# Patient Record
Sex: FEMALE | Race: WHITE | NOT HISPANIC OR LATINO | URBAN - NONMETROPOLITAN AREA
[De-identification: names, ages, dates, MRNs, and addresses within clinical notes are randomized per-mention and may not be internally consistent; named-entity substitution may affect disease eponyms.]

---

## 2023-01-09 LAB
EXTERNAL HEPATITIS B SURFACE ANTIGEN: NEGATIVE
EXTERNAL RUBELLA QUALITATIVE: NORMAL
EXTERNAL SYPHILIS RPR SCREEN: NORMAL
HIV1 P24 AG SERPL QL IA: NORMAL

## 2023-06-29 LAB — EXTERNAL GROUP B STREP ANTIGEN: NEGATIVE

## 2023-07-17 ENCOUNTER — HOSPITAL ENCOUNTER (INPATIENT)
Facility: HOSPITAL | Age: 28
LOS: 3 days | Discharge: HOME | End: 2023-07-20
Attending: OBSTETRICS & GYNECOLOGY | Admitting: OBSTETRICS & GYNECOLOGY
Payer: COMMERCIAL

## 2023-07-17 PROBLEM — Z34.90 PREGNANCY: Status: ACTIVE | Noted: 2023-07-17

## 2023-07-17 LAB
ABO GROUP BLD: NORMAL
ABO GROUP BLD: NORMAL
AMPHET+METHAMPHET UR QL: NEGATIVE
AMPHETAMINES UR QL: NEGATIVE
BARBITURATES UR QL SCN: NEGATIVE
BASOPHILS # BLD AUTO: 0.03 10*3/MM3 (ref 0–0.2)
BASOPHILS NFR BLD AUTO: 0.3 % (ref 0–1.5)
BENZODIAZ UR QL SCN: NEGATIVE
BLD GP AB SCN SERPL QL: NEGATIVE
BUPRENORPHINE SERPL-MCNC: NEGATIVE NG/ML
CANNABINOIDS SERPL QL: NEGATIVE
COCAINE UR QL: NEGATIVE
DEPRECATED RDW RBC AUTO: 43.8 FL (ref 37–54)
EOSINOPHIL # BLD AUTO: 0.1 10*3/MM3 (ref 0–0.4)
EOSINOPHIL NFR BLD AUTO: 1 % (ref 0.3–6.2)
ERYTHROCYTE [DISTWIDTH] IN BLOOD BY AUTOMATED COUNT: 13.4 % (ref 12.3–15.4)
FENTANYL UR-MCNC: NEGATIVE NG/ML
HCT VFR BLD AUTO: 41.5 % (ref 34–46.6)
HGB BLD-MCNC: 13.3 G/DL (ref 12–15.9)
IMM GRANULOCYTES # BLD AUTO: 0.05 10*3/MM3 (ref 0–0.05)
IMM GRANULOCYTES NFR BLD AUTO: 0.5 % (ref 0–0.5)
LYMPHOCYTES # BLD AUTO: 2.5 10*3/MM3 (ref 0.7–3.1)
LYMPHOCYTES NFR BLD AUTO: 24.9 % (ref 19.6–45.3)
MCH RBC QN AUTO: 28.6 PG (ref 26.6–33)
MCHC RBC AUTO-ENTMCNC: 32 G/DL (ref 31.5–35.7)
MCV RBC AUTO: 89.2 FL (ref 79–97)
METHADONE UR QL SCN: NEGATIVE
MONOCYTES # BLD AUTO: 0.72 10*3/MM3 (ref 0.1–0.9)
MONOCYTES NFR BLD AUTO: 7.2 % (ref 5–12)
NEUTROPHILS NFR BLD AUTO: 6.63 10*3/MM3 (ref 1.7–7)
NEUTROPHILS NFR BLD AUTO: 66.1 % (ref 42.7–76)
NRBC BLD AUTO-RTO: 0 /100 WBC (ref 0–0.2)
OPIATES UR QL: NEGATIVE
OXYCODONE UR QL SCN: NEGATIVE
PCP UR QL SCN: NEGATIVE
PLATELET # BLD AUTO: 230 10*3/MM3 (ref 140–450)
PMV BLD AUTO: 10.6 FL (ref 6–12)
PROPOXYPH UR QL: NEGATIVE
RBC # BLD AUTO: 4.65 10*6/MM3 (ref 3.77–5.28)
RH BLD: POSITIVE
RH BLD: POSITIVE
T&S EXPIRATION DATE: NORMAL
TRICYCLICS UR QL SCN: NEGATIVE
WBC NRBC COR # BLD: 10.03 10*3/MM3 (ref 3.4–10.8)

## 2023-07-17 PROCEDURE — 86901 BLOOD TYPING SEROLOGIC RH(D): CPT

## 2023-07-17 PROCEDURE — 80307 DRUG TEST PRSMV CHEM ANLYZR: CPT | Performed by: OBSTETRICS & GYNECOLOGY

## 2023-07-17 PROCEDURE — 86900 BLOOD TYPING SEROLOGIC ABO: CPT | Performed by: OBSTETRICS & GYNECOLOGY

## 2023-07-17 PROCEDURE — 85025 COMPLETE CBC W/AUTO DIFF WBC: CPT | Performed by: OBSTETRICS & GYNECOLOGY

## 2023-07-17 PROCEDURE — 86850 RBC ANTIBODY SCREEN: CPT | Performed by: OBSTETRICS & GYNECOLOGY

## 2023-07-17 PROCEDURE — 86900 BLOOD TYPING SEROLOGIC ABO: CPT

## 2023-07-17 PROCEDURE — 86901 BLOOD TYPING SEROLOGIC RH(D): CPT | Performed by: OBSTETRICS & GYNECOLOGY

## 2023-07-17 RX ORDER — MISOPROSTOL 100 UG/1
25 TABLET ORAL EVERY 4 HOURS PRN
Status: DISCONTINUED | OUTPATIENT
Start: 2023-07-17 | End: 2023-07-19 | Stop reason: HOSPADM

## 2023-07-17 RX ORDER — ACETAMINOPHEN 325 MG/1
650 TABLET ORAL EVERY 4 HOURS PRN
Status: DISCONTINUED | OUTPATIENT
Start: 2023-07-17 | End: 2023-07-19 | Stop reason: HOSPADM

## 2023-07-17 RX ORDER — OXYTOCIN/0.9 % SODIUM CHLORIDE 30/500 ML
2-20 PLASTIC BAG, INJECTION (ML) INTRAVENOUS
Status: DISCONTINUED | OUTPATIENT
Start: 2023-07-18 | End: 2023-07-19 | Stop reason: HOSPADM

## 2023-07-17 RX ORDER — HYDROXYZINE HYDROCHLORIDE 25 MG/1
50 TABLET, FILM COATED ORAL NIGHTLY PRN
Status: DISCONTINUED | OUTPATIENT
Start: 2023-07-17 | End: 2023-07-19 | Stop reason: HOSPADM

## 2023-07-17 RX ORDER — TERBUTALINE SULFATE 1 MG/ML
0.25 INJECTION, SOLUTION SUBCUTANEOUS AS NEEDED
Status: DISCONTINUED | OUTPATIENT
Start: 2023-07-17 | End: 2023-07-19 | Stop reason: HOSPADM

## 2023-07-17 RX ORDER — SODIUM CHLORIDE, SODIUM LACTATE, POTASSIUM CHLORIDE, CALCIUM CHLORIDE 600; 310; 30; 20 MG/100ML; MG/100ML; MG/100ML; MG/100ML
125 INJECTION, SOLUTION INTRAVENOUS CONTINUOUS
Status: DISCONTINUED | OUTPATIENT
Start: 2023-07-17 | End: 2023-07-19

## 2023-07-17 RX ORDER — BUTORPHANOL TARTRATE 1 MG/ML
1 INJECTION, SOLUTION INTRAMUSCULAR; INTRAVENOUS
Status: DISCONTINUED | OUTPATIENT
Start: 2023-07-17 | End: 2023-07-19 | Stop reason: HOSPADM

## 2023-07-17 RX ORDER — ONDANSETRON 4 MG/1
4 TABLET, FILM COATED ORAL EVERY 6 HOURS PRN
Status: DISCONTINUED | OUTPATIENT
Start: 2023-07-17 | End: 2023-07-19 | Stop reason: HOSPADM

## 2023-07-17 RX ORDER — MAGNESIUM HYDROXIDE 1200 MG/15ML
1000 LIQUID ORAL ONCE AS NEEDED
Status: DISCONTINUED | OUTPATIENT
Start: 2023-07-17 | End: 2023-07-19 | Stop reason: HOSPADM

## 2023-07-17 RX ORDER — ONDANSETRON 2 MG/ML
4 INJECTION INTRAMUSCULAR; INTRAVENOUS EVERY 6 HOURS PRN
Status: DISCONTINUED | OUTPATIENT
Start: 2023-07-17 | End: 2023-07-19 | Stop reason: HOSPADM

## 2023-07-17 RX ORDER — SODIUM CHLORIDE 0.9 % (FLUSH) 0.9 %
3-10 SYRINGE (ML) INJECTION AS NEEDED
Status: DISCONTINUED | OUTPATIENT
Start: 2023-07-17 | End: 2023-07-19 | Stop reason: HOSPADM

## 2023-07-18 PROCEDURE — 3E033VJ INTRODUCTION OF OTHER HORMONE INTO PERIPHERAL VEIN, PERCUTANEOUS APPROACH: ICD-10-PCS | Performed by: OBSTETRICS & GYNECOLOGY

## 2023-07-18 PROCEDURE — C1755 CATHETER, INTRASPINAL: HCPCS

## 2023-07-18 PROCEDURE — 25010000002 KETOROLAC TROMETHAMINE PER 15 MG: Performed by: OBSTETRICS & GYNECOLOGY

## 2023-07-18 PROCEDURE — 25010000002 ROPIVACAINE PER 1 MG: Performed by: ANESTHESIOLOGY

## 2023-07-18 RX ORDER — ONDANSETRON 2 MG/ML
4 INJECTION INTRAMUSCULAR; INTRAVENOUS EVERY 6 HOURS PRN
Status: DISCONTINUED | OUTPATIENT
Start: 2023-07-18 | End: 2023-07-19 | Stop reason: HOSPADM

## 2023-07-18 RX ORDER — SODIUM CHLORIDE, SODIUM LACTATE, POTASSIUM CHLORIDE, CALCIUM CHLORIDE 600; 310; 30; 20 MG/100ML; MG/100ML; MG/100ML; MG/100ML
125 INJECTION, SOLUTION INTRAVENOUS CONTINUOUS
Status: DISCONTINUED | OUTPATIENT
Start: 2023-07-18 | End: 2023-07-19

## 2023-07-18 RX ORDER — MORPHINE SULFATE 2 MG/ML
2 INJECTION, SOLUTION INTRAMUSCULAR; INTRAVENOUS
Status: DISCONTINUED | OUTPATIENT
Start: 2023-07-18 | End: 2023-07-19 | Stop reason: HOSPADM

## 2023-07-18 RX ORDER — PROMETHAZINE HYDROCHLORIDE 12.5 MG/1
12.5 SUPPOSITORY RECTAL EVERY 6 HOURS PRN
Status: DISCONTINUED | OUTPATIENT
Start: 2023-07-18 | End: 2023-07-19 | Stop reason: HOSPADM

## 2023-07-18 RX ORDER — SODIUM CHLORIDE 9 MG/ML
40 INJECTION, SOLUTION INTRAVENOUS AS NEEDED
Status: DISCONTINUED | OUTPATIENT
Start: 2023-07-18 | End: 2023-07-19

## 2023-07-18 RX ORDER — OXYTOCIN/0.9 % SODIUM CHLORIDE 30/500 ML
999 PLASTIC BAG, INJECTION (ML) INTRAVENOUS ONCE
Status: DISCONTINUED | OUTPATIENT
Start: 2023-07-18 | End: 2023-07-19 | Stop reason: HOSPADM

## 2023-07-18 RX ORDER — ONDANSETRON 2 MG/ML
4 INJECTION INTRAMUSCULAR; INTRAVENOUS ONCE AS NEEDED
Status: DISCONTINUED | OUTPATIENT
Start: 2023-07-18 | End: 2023-07-19 | Stop reason: HOSPADM

## 2023-07-18 RX ORDER — METOCLOPRAMIDE HYDROCHLORIDE 5 MG/ML
10 INJECTION INTRAMUSCULAR; INTRAVENOUS EVERY 6 HOURS PRN
Status: DISCONTINUED | OUTPATIENT
Start: 2023-07-18 | End: 2023-07-19 | Stop reason: HOSPADM

## 2023-07-18 RX ORDER — EPHEDRINE SULFATE 5 MG/ML
10 INJECTION INTRAVENOUS
Status: DISCONTINUED | OUTPATIENT
Start: 2023-07-18 | End: 2023-07-19 | Stop reason: HOSPADM

## 2023-07-18 RX ORDER — ONDANSETRON 4 MG/1
4 TABLET, FILM COATED ORAL EVERY 6 HOURS PRN
Status: DISCONTINUED | OUTPATIENT
Start: 2023-07-18 | End: 2023-07-19 | Stop reason: HOSPADM

## 2023-07-18 RX ORDER — SODIUM CHLORIDE 0.9 % (FLUSH) 0.9 %
10 SYRINGE (ML) INJECTION AS NEEDED
Status: DISCONTINUED | OUTPATIENT
Start: 2023-07-18 | End: 2023-07-19

## 2023-07-18 RX ORDER — PRENATAL VIT NO.126/IRON/FOLIC 28MG-0.8MG
1 TABLET ORAL DAILY
COMMUNITY

## 2023-07-18 RX ORDER — PRENATAL VIT/IRON FUM/FOLIC AC 27MG-0.8MG
1 TABLET ORAL DAILY
Status: DISCONTINUED | OUTPATIENT
Start: 2023-07-18 | End: 2023-07-19

## 2023-07-18 RX ORDER — METHYLERGONOVINE MALEATE 0.2 MG/ML
200 INJECTION INTRAVENOUS AS NEEDED
Status: DISCONTINUED | OUTPATIENT
Start: 2023-07-18 | End: 2023-07-19 | Stop reason: HOSPADM

## 2023-07-18 RX ORDER — KETOROLAC TROMETHAMINE 30 MG/ML
30 INJECTION, SOLUTION INTRAMUSCULAR; INTRAVENOUS ONCE
Status: COMPLETED | OUTPATIENT
Start: 2023-07-18 | End: 2023-07-18

## 2023-07-18 RX ORDER — SODIUM CHLORIDE, SODIUM LACTATE, POTASSIUM CHLORIDE, CALCIUM CHLORIDE 600; 310; 30; 20 MG/100ML; MG/100ML; MG/100ML; MG/100ML
125 INJECTION, SOLUTION INTRAVENOUS ONCE
Status: DISCONTINUED | OUTPATIENT
Start: 2023-07-18 | End: 2023-07-19

## 2023-07-18 RX ORDER — PROMETHAZINE HYDROCHLORIDE 25 MG/1
12.5 TABLET ORAL EVERY 6 HOURS PRN
Status: DISCONTINUED | OUTPATIENT
Start: 2023-07-18 | End: 2023-07-19 | Stop reason: HOSPADM

## 2023-07-18 RX ORDER — FAMOTIDINE 10 MG/ML
20 INJECTION, SOLUTION INTRAVENOUS ONCE AS NEEDED
Status: DISCONTINUED | OUTPATIENT
Start: 2023-07-18 | End: 2023-07-19 | Stop reason: HOSPADM

## 2023-07-18 RX ORDER — SODIUM CHLORIDE 0.9 % (FLUSH) 0.9 %
10 SYRINGE (ML) INJECTION EVERY 12 HOURS SCHEDULED
Status: DISCONTINUED | OUTPATIENT
Start: 2023-07-18 | End: 2023-07-19

## 2023-07-18 RX ORDER — OXYTOCIN/0.9 % SODIUM CHLORIDE 30/500 ML
250 PLASTIC BAG, INJECTION (ML) INTRAVENOUS CONTINUOUS
Status: ACTIVE | OUTPATIENT
Start: 2023-07-18 | End: 2023-07-18

## 2023-07-18 RX ORDER — MISOPROSTOL 100 UG/1
600 TABLET ORAL AS NEEDED
Status: DISCONTINUED | OUTPATIENT
Start: 2023-07-18 | End: 2023-07-19 | Stop reason: HOSPADM

## 2023-07-18 RX ORDER — FOLIC ACID 1 MG/1
1 TABLET ORAL DAILY
COMMUNITY
End: 2023-07-20 | Stop reason: HOSPADM

## 2023-07-18 RX ORDER — ACETAMINOPHEN 500 MG
1000 TABLET ORAL ONCE
Status: COMPLETED | OUTPATIENT
Start: 2023-07-18 | End: 2023-07-18

## 2023-07-18 RX ORDER — MAGNESIUM HYDROXIDE 1200 MG/15ML
3000 LIQUID ORAL ONCE AS NEEDED
Status: DISCONTINUED | OUTPATIENT
Start: 2023-07-18 | End: 2023-07-19 | Stop reason: HOSPADM

## 2023-07-18 RX ORDER — METHYLERGONOVINE MALEATE 0.2 MG/ML
200 INJECTION INTRAVENOUS ONCE AS NEEDED
Status: DISCONTINUED | OUTPATIENT
Start: 2023-07-18 | End: 2023-07-19 | Stop reason: HOSPADM

## 2023-07-18 RX ORDER — METOCLOPRAMIDE HYDROCHLORIDE 5 MG/ML
10 INJECTION INTRAMUSCULAR; INTRAVENOUS ONCE AS NEEDED
Status: DISCONTINUED | OUTPATIENT
Start: 2023-07-18 | End: 2023-07-19 | Stop reason: HOSPADM

## 2023-07-18 RX ORDER — MIDAZOLAM HYDROCHLORIDE 1 MG/ML
1 INJECTION INTRAMUSCULAR; INTRAVENOUS
Status: DISCONTINUED | OUTPATIENT
Start: 2023-07-18 | End: 2023-07-19

## 2023-07-18 RX ORDER — SODIUM CHLORIDE 0.9 % (FLUSH) 0.9 %
3-10 SYRINGE (ML) INJECTION AS NEEDED
Status: DISCONTINUED | OUTPATIENT
Start: 2023-07-18 | End: 2023-07-19 | Stop reason: HOSPADM

## 2023-07-18 RX ORDER — ROPIVACAINE HYDROCHLORIDE 2 MG/ML
14 INJECTION, SOLUTION EPIDURAL; INFILTRATION; PERINEURAL CONTINUOUS
Status: DISCONTINUED | OUTPATIENT
Start: 2023-07-18 | End: 2023-07-19

## 2023-07-18 RX ORDER — FOLIC ACID 1 MG/1
1 TABLET ORAL DAILY
Status: DISCONTINUED | OUTPATIENT
Start: 2023-07-18 | End: 2023-07-19

## 2023-07-18 RX ORDER — CARBOPROST TROMETHAMINE 250 UG/ML
250 INJECTION, SOLUTION INTRAMUSCULAR AS NEEDED
Status: DISCONTINUED | OUTPATIENT
Start: 2023-07-18 | End: 2023-07-19 | Stop reason: HOSPADM

## 2023-07-18 RX ADMIN — ACETAMINOPHEN 1000 MG: 500 TABLET ORAL at 20:25

## 2023-07-18 RX ADMIN — ROPIVACAINE HYDROCHLORIDE 14 ML/HR: 2 INJECTION, SOLUTION EPIDURAL; INFILTRATION at 17:09

## 2023-07-18 RX ADMIN — MISOPROSTOL 25 MCG: 100 TABLET ORAL at 04:04

## 2023-07-18 RX ADMIN — SODIUM CHLORIDE, POTASSIUM CHLORIDE, SODIUM LACTATE AND CALCIUM CHLORIDE 125 ML/HR: 600; 310; 30; 20 INJECTION, SOLUTION INTRAVENOUS at 15:13

## 2023-07-18 RX ADMIN — SODIUM CHLORIDE, POTASSIUM CHLORIDE, SODIUM LACTATE AND CALCIUM CHLORIDE 125 ML/HR: 600; 310; 30; 20 INJECTION, SOLUTION INTRAVENOUS at 00:05

## 2023-07-18 RX ADMIN — KETOROLAC TROMETHAMINE 30 MG: 30 INJECTION, SOLUTION INTRAMUSCULAR; INTRAVENOUS at 22:32

## 2023-07-18 RX ADMIN — Medication 2 MILLI-UNITS/MIN: at 15:17

## 2023-07-18 RX ADMIN — SODIUM CHLORIDE, POTASSIUM CHLORIDE, SODIUM LACTATE AND CALCIUM CHLORIDE 1000 ML: 600; 310; 30; 20 INJECTION, SOLUTION INTRAVENOUS at 10:37

## 2023-07-18 RX ADMIN — Medication 250 ML/HR: at 22:07

## 2023-07-18 RX ADMIN — MISOPROSTOL 25 MCG: 100 TABLET ORAL at 00:10

## 2023-07-18 RX ADMIN — MISOPROSTOL 25 MCG: 100 TABLET ORAL at 10:58

## 2023-07-18 NOTE — PLAN OF CARE
Problem: Adult Inpatient Plan of Care  Goal: Patient-Specific Goal (Individualized)  Outcome: Ongoing, Progressing   Goal Outcome Evaluation:                 Garcia Bulb in place, pitocin infusing, epidural infusing, pt tolerating labor at this time, will cont to monitor

## 2023-07-18 NOTE — PROGRESS NOTES
In to see pt    Pt resting comfortably with epidural. S/p cytotec x 3    Cvx: 1/50/-3  Ludwig bulb placed for additional cervical ripening  Will start pitocin to run while ludwig bulb in place    Kassi Martinez MD

## 2023-07-18 NOTE — H&P
SUNITA Verdugo  Obstetric History and Physical    Chief Complaint   Patient presents with    Scheduled Induction     PT ARRIVED TO L/D FOR SCHEDULED TERM CYTOTEC INDUCTION.       Subjective     Patient is a 27 y.o. female  currently at 39w0d, who presents with elective IOL at term.    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     She denies history of asthma, HTN, HSV.      Prenatal Information:   Maternal Prenatal Labs  Blood Type ABO Type   Date Value Ref Range Status   2023 A  Final      Rh Status RH type   Date Value Ref Range Status   2023 Positive  Final      Antibody Screen Antibody Screen   Date Value Ref Range Status   2023 Negative  Final      Rapid Urin Drug Screen Barbiturates Screen, Urine   Date Value Ref Range Status   2023 Negative Negative Final     Benzodiazepine Screen, Urine   Date Value Ref Range Status   2023 Negative Negative Final     Methadone Screen, Urine   Date Value Ref Range Status   2023 Negative Negative Final     Opiate Screen   Date Value Ref Range Status   2023 Negative Negative Final     THC, Screen, Urine   Date Value Ref Range Status   2023 Negative Negative Final     Cocaine Screen, Urine   Date Value Ref Range Status   2023 Negative Negative Final     Amphetamine Screen, Urine   Date Value Ref Range Status   2023 Negative Negative Final     Propoxyphene Screen   Date Value Ref Range Status   2023 Negative Negative Final     Buprenorphine, Screen, Urine   Date Value Ref Range Status   2023 Negative Negative Final     Methamphetamine, Ur   Date Value Ref Range Status   2023 Negative Negative Final     Oxycodone Screen, Urine   Date Value Ref Range Status   2023 Negative Negative Final     Tricyclic Antidepressants  Screen   Date Value Ref Range Status   2023 Negative Negative Final      Group B Strep Culture No results found for: GBSANTIGEN           External Prenatal Results       Pregnancy Outside Results - Transcribed From Office Records - See Scanned Records For Details       Test Value Date Time    ABO  A  23    Rh  Positive  23    Antibody Screen  Negative  23    Varicella IgG       Rubella ^ Non-Immune  23     Hgb  13.3 g/dL 23    Hct  41.5 % 23    Glucose Fasting GTT       Glucose Tolerance Test 1 hour       Glucose Tolerance Test 3 hour       Gonorrhea (discrete)       Chlamydia (discrete)       RPR ^ Non-Reactive  23     VDRL       Syphilis Antibody       HBsAg ^ Negative  23     Herpes Simplex Virus PCR       Herpes Simplex VIrus Culture       HIV ^ Non-Reactive  23     Hep C RNA Quant PCR       Hep C Antibody       AFP       Group B Strep ^ Negative  23     GBS Susceptibility to Clindamycin       GBS Susceptibility to Erythromycin       Fetal Fibronectin       Genetic Testing, Maternal Blood                 Drug Screening       Test Value Date Time    Urine Drug Screen       Amphetamine Screen  Negative  23    Barbiturate Screen  Negative  23    Benzodiazepine Screen  Negative  23    Methadone Screen  Negative  23    Phencyclidine Screen  Negative  23    Opiates Screen  Negative  23    THC Screen  Negative  23    Cocaine Screen       Propoxyphene Screen  Negative  23    Buprenorphine Screen  Negative  23    Methamphetamine Screen       Oxycodone Screen  Negative  23    Tricyclic Antidepressants Screen  Negative  23              Legend    ^: Historical                              Past OB History:     OB History    Para Term  AB Living   1 0 0 0 0 0   SAB IAB Ectopic Molar Multiple Live Births    0 0 0 0 0 0      # Outcome Date GA Lbr Vadim/2nd Weight Sex Delivery Anes PTL Lv   1 Current                Past Medical History: Past Medical History:   Diagnosis Date    Anxiety     Asthma     Depression     Klippel-Feil syndrome       Past Surgical History No past surgical history on file.   Family History: No family history on file.   Social History:  has no history on file for tobacco use.   has no history on file for alcohol use.   has no history on file for drug use.        Review of Systems  Complete ROS including constitutional, skin, HENT, Eyes, CV, Resp, GI, , MS, Endo/heme/allergies, Neuro, and Psych is negative unless otherwise indicated above or detailed in HPI      Objective     Vital Signs Range for the last 24 hours  Temperature: Temp:  [98.9 °F (37.2 °C)] 98.9 °F (37.2 °C)   Temp Source:     BP: BP: (106-127)/(56-71) 106/56   Pulse: Heart Rate:  [78-92] 78   Respirations:     Weight: Weight:  [113 kg (249 lb 14.4 oz)] 113 kg (249 lb 14.4 oz)     Physical Examination:    Gen: NAD   CV: regular rate   Resp:  normal work of breathing on RA   Abd: soft, nontender, gravid   Extr: no edema bilaterally     Presentation: Vertex on BSUS        Laboratory Results:    Latest Reference Range & Units 07/17/23 22:23   ABO Type  A   RH type  Positive   Antibody Screen  Negative   T&S Expiration Date  7/20/2023 11:59:59 PM   WBC 3.40 - 10.80 10*3/mm3 10.03   RBC 3.77 - 5.28 10*6/mm3 4.65   Hemoglobin 12.0 - 15.9 g/dL 13.3   Hematocrit 34.0 - 46.6 % 41.5   Platelets 140 - 450 10*3/mm3 230   RDW 12.3 - 15.4 % 13.4   MCV 79.0 - 97.0 fL 89.2   MCH 26.6 - 33.0 pg 28.6   MCHC 31.5 - 35.7 g/dL 32.0   MPV 6.0 - 12.0 fL 10.6   RDW-SD 37.0 - 54.0 fl 43.8   Neutrophil Rel % 42.7 - 76.0 % 66.1   Lymphocyte Rel % 19.6 - 45.3 % 24.9   Monocyte Rel % 5.0 - 12.0 % 7.2   Eosinophil Rel % 0.3 - 6.2 % 1.0   Basophil Rel % 0.0 - 1.5 % 0.3   Immature Granulocyte Rel % 0.0 - 0.5 % 0.5   Neutrophils Absolute 1.70 - 7.00 10*3/mm3 6.63    Lymphocytes Absolute 0.70 - 3.10 10*3/mm3 2.50   Monocytes Absolute 0.10 - 0.90 10*3/mm3 0.72   Eosinophils Absolute 0.00 - 0.40 10*3/mm3 0.10   Basophils Absolute 0.00 - 0.20 10*3/mm3 0.03   Immature Grans, Absolute 0.00 - 0.05 10*3/mm3 0.05   nRBC 0.0 - 0.2 /100 WBC 0.0       Assessment/Plan:  Pt is 27 y.o.  39w0d admitted for IOL   1. Admit to L&D  2. IOL: cytotec to pitocin  3. CEFM  4. GBS: neg - no ppx indicated    Kassi Martinez MD  2023  10:01 EDT

## 2023-07-18 NOTE — NON STRESS TEST
Emilia Weber, a  at 39w0d with an RYAN of 2023, Date entered prior to episode creation, was seen at [facility] LABOR DELIVERY for a nonstress test.    Chief Complaint   Patient presents with    Scheduled Induction     PT ARRIVED TO L/D FOR SCHEDULED TERM CYTOTEC INDUCTION.       Patient Active Problem List   Diagnosis    Pregnancy       Start Time:   Stop Time:     Interpretation A  Nonstress Test Interpretation A: Reactive  Comments A: VERIFIED BY DONELL VILLAFUERTE RN

## 2023-07-18 NOTE — PLAN OF CARE
Goal Outcome Evaluation:  Plan of Care Reviewed With: patient        Progress: improving  Outcome Evaluation: VSS. CYTOTEC PLACED. LR INFUSING. PT RESTING IN BED WITH NO COMPLAINTS.

## 2023-07-19 LAB
ALBUMIN SERPL-MCNC: 2.9 G/DL (ref 3.5–5.2)
ALBUMIN/GLOB SERPL: 1.3 G/DL
ALP SERPL-CCNC: 107 U/L (ref 39–117)
ALT SERPL W P-5'-P-CCNC: 10 U/L (ref 1–33)
ANION GAP SERPL CALCULATED.3IONS-SCNC: 7.6 MMOL/L (ref 5–15)
AST SERPL-CCNC: 16 U/L (ref 1–32)
BASOPHILS # BLD AUTO: 0.03 10*3/MM3 (ref 0–0.2)
BASOPHILS NFR BLD AUTO: 0.3 % (ref 0–1.5)
BILIRUB SERPL-MCNC: 0.4 MG/DL (ref 0–1.2)
BUN SERPL-MCNC: 6 MG/DL (ref 6–20)
BUN/CREAT SERPL: 9.7 (ref 7–25)
CALCIUM SPEC-SCNC: 8.2 MG/DL (ref 8.6–10.5)
CHLORIDE SERPL-SCNC: 110 MMOL/L (ref 98–107)
CO2 SERPL-SCNC: 21.4 MMOL/L (ref 22–29)
CREAT SERPL-MCNC: 0.62 MG/DL (ref 0.57–1)
DEPRECATED RDW RBC AUTO: 43.8 FL (ref 37–54)
EGFRCR SERPLBLD CKD-EPI 2021: 125.4 ML/MIN/1.73
EOSINOPHIL # BLD AUTO: 0.03 10*3/MM3 (ref 0–0.4)
EOSINOPHIL NFR BLD AUTO: 0.3 % (ref 0.3–6.2)
ERYTHROCYTE [DISTWIDTH] IN BLOOD BY AUTOMATED COUNT: 13.4 % (ref 12.3–15.4)
GLOBULIN UR ELPH-MCNC: 2.2 GM/DL
GLUCOSE SERPL-MCNC: 95 MG/DL (ref 65–99)
HCT VFR BLD AUTO: 35.5 % (ref 34–46.6)
HGB BLD-MCNC: 11.3 G/DL (ref 12–15.9)
IMM GRANULOCYTES # BLD AUTO: 0.06 10*3/MM3 (ref 0–0.05)
IMM GRANULOCYTES NFR BLD AUTO: 0.6 % (ref 0–0.5)
LYMPHOCYTES # BLD AUTO: 2.13 10*3/MM3 (ref 0.7–3.1)
LYMPHOCYTES NFR BLD AUTO: 19.9 % (ref 19.6–45.3)
MCH RBC QN AUTO: 28.8 PG (ref 26.6–33)
MCHC RBC AUTO-ENTMCNC: 31.8 G/DL (ref 31.5–35.7)
MCV RBC AUTO: 90.3 FL (ref 79–97)
MONOCYTES # BLD AUTO: 1.13 10*3/MM3 (ref 0.1–0.9)
MONOCYTES NFR BLD AUTO: 10.6 % (ref 5–12)
NEUTROPHILS NFR BLD AUTO: 68.3 % (ref 42.7–76)
NEUTROPHILS NFR BLD AUTO: 7.33 10*3/MM3 (ref 1.7–7)
NRBC BLD AUTO-RTO: 0 /100 WBC (ref 0–0.2)
PLATELET # BLD AUTO: 177 10*3/MM3 (ref 140–450)
PMV BLD AUTO: 10.7 FL (ref 6–12)
POTASSIUM SERPL-SCNC: 3.7 MMOL/L (ref 3.5–5.2)
PROT SERPL-MCNC: 5.1 G/DL (ref 6–8.5)
RBC # BLD AUTO: 3.93 10*6/MM3 (ref 3.77–5.28)
SODIUM SERPL-SCNC: 139 MMOL/L (ref 136–145)
WBC NRBC COR # BLD: 10.71 10*3/MM3 (ref 3.4–10.8)

## 2023-07-19 PROCEDURE — 80053 COMPREHEN METABOLIC PANEL: CPT | Performed by: OBSTETRICS & GYNECOLOGY

## 2023-07-19 PROCEDURE — 85025 COMPLETE CBC W/AUTO DIFF WBC: CPT | Performed by: OBSTETRICS & GYNECOLOGY

## 2023-07-19 PROCEDURE — 25010000002 KETOROLAC TROMETHAMINE PER 15 MG: Performed by: OBSTETRICS & GYNECOLOGY

## 2023-07-19 RX ORDER — PROCHLORPERAZINE EDISYLATE 5 MG/ML
10 INJECTION INTRAMUSCULAR; INTRAVENOUS EVERY 6 HOURS PRN
Status: DISCONTINUED | OUTPATIENT
Start: 2023-07-19 | End: 2023-07-20 | Stop reason: HOSPADM

## 2023-07-19 RX ORDER — DIPHENHYDRAMINE HYDROCHLORIDE 50 MG/ML
25 INJECTION INTRAMUSCULAR; INTRAVENOUS EVERY 4 HOURS PRN
Status: DISCONTINUED | OUTPATIENT
Start: 2023-07-19 | End: 2023-07-20 | Stop reason: HOSPADM

## 2023-07-19 RX ORDER — DIPHENHYDRAMINE HCL 25 MG
25 CAPSULE ORAL EVERY 4 HOURS PRN
Status: DISCONTINUED | OUTPATIENT
Start: 2023-07-19 | End: 2023-07-20 | Stop reason: HOSPADM

## 2023-07-19 RX ORDER — HYDROCODONE BITARTRATE AND ACETAMINOPHEN 7.5; 325 MG/1; MG/1
1 TABLET ORAL EVERY 4 HOURS PRN
Status: DISCONTINUED | OUTPATIENT
Start: 2023-07-19 | End: 2023-07-20 | Stop reason: HOSPADM

## 2023-07-19 RX ORDER — CALCIUM CARBONATE 500 MG/1
1 TABLET, CHEWABLE ORAL EVERY 4 HOURS PRN
Status: DISCONTINUED | OUTPATIENT
Start: 2023-07-19 | End: 2023-07-20 | Stop reason: HOSPADM

## 2023-07-19 RX ORDER — ALUMINA, MAGNESIA, AND SIMETHICONE 2400; 2400; 240 MG/30ML; MG/30ML; MG/30ML
15 SUSPENSION ORAL EVERY 4 HOURS PRN
Status: DISCONTINUED | OUTPATIENT
Start: 2023-07-19 | End: 2023-07-20 | Stop reason: HOSPADM

## 2023-07-19 RX ORDER — TEMAZEPAM 7.5 MG/1
7.5 CAPSULE ORAL NIGHTLY PRN
Status: DISCONTINUED | OUTPATIENT
Start: 2023-07-19 | End: 2023-07-20 | Stop reason: HOSPADM

## 2023-07-19 RX ORDER — PRENATAL VIT/IRON FUM/FOLIC AC 27MG-0.8MG
1 TABLET ORAL DAILY
Status: DISCONTINUED | OUTPATIENT
Start: 2023-07-19 | End: 2023-07-20 | Stop reason: HOSPADM

## 2023-07-19 RX ORDER — SIMETHICONE 80 MG
80 TABLET,CHEWABLE ORAL 4 TIMES DAILY
Status: DISCONTINUED | OUTPATIENT
Start: 2023-07-19 | End: 2023-07-20 | Stop reason: HOSPADM

## 2023-07-19 RX ORDER — ONDANSETRON 4 MG/1
4 TABLET, FILM COATED ORAL EVERY 8 HOURS PRN
Status: DISCONTINUED | OUTPATIENT
Start: 2023-07-19 | End: 2023-07-20 | Stop reason: HOSPADM

## 2023-07-19 RX ORDER — ACETAMINOPHEN 325 MG/1
650 TABLET ORAL EVERY 6 HOURS
Status: DISCONTINUED | OUTPATIENT
Start: 2023-07-20 | End: 2023-07-20 | Stop reason: HOSPADM

## 2023-07-19 RX ORDER — ONDANSETRON 4 MG/1
4 TABLET, FILM COATED ORAL EVERY 6 HOURS PRN
Status: DISCONTINUED | OUTPATIENT
Start: 2023-07-19 | End: 2023-07-20 | Stop reason: HOSPADM

## 2023-07-19 RX ORDER — OXYTOCIN/0.9 % SODIUM CHLORIDE 30/500 ML
999 PLASTIC BAG, INJECTION (ML) INTRAVENOUS ONCE
Status: DISCONTINUED | OUTPATIENT
Start: 2023-07-19 | End: 2023-07-20 | Stop reason: HOSPADM

## 2023-07-19 RX ORDER — KETOROLAC TROMETHAMINE 30 MG/ML
15 INJECTION, SOLUTION INTRAMUSCULAR; INTRAVENOUS EVERY 6 HOURS
Status: DISCONTINUED | OUTPATIENT
Start: 2023-07-19 | End: 2023-07-19

## 2023-07-19 RX ORDER — OXYCODONE HYDROCHLORIDE 10 MG/1
10 TABLET ORAL EVERY 4 HOURS PRN
Status: DISCONTINUED | OUTPATIENT
Start: 2023-07-19 | End: 2023-07-20 | Stop reason: HOSPADM

## 2023-07-19 RX ORDER — MORPHINE SULFATE 2 MG/ML
2 INJECTION, SOLUTION INTRAMUSCULAR; INTRAVENOUS
Status: DISCONTINUED | OUTPATIENT
Start: 2023-07-19 | End: 2023-07-20 | Stop reason: HOSPADM

## 2023-07-19 RX ORDER — HYDROXYZINE HYDROCHLORIDE 25 MG/1
50 TABLET, FILM COATED ORAL EVERY 6 HOURS PRN
Status: DISCONTINUED | OUTPATIENT
Start: 2023-07-19 | End: 2023-07-20 | Stop reason: HOSPADM

## 2023-07-19 RX ORDER — OXYTOCIN/0.9 % SODIUM CHLORIDE 30/500 ML
250 PLASTIC BAG, INJECTION (ML) INTRAVENOUS CONTINUOUS
Status: ACTIVE | OUTPATIENT
Start: 2023-07-19 | End: 2023-07-19

## 2023-07-19 RX ORDER — IBUPROFEN 800 MG/1
800 TABLET ORAL EVERY 8 HOURS SCHEDULED
Status: DISCONTINUED | OUTPATIENT
Start: 2023-07-19 | End: 2023-07-19

## 2023-07-19 RX ORDER — DOCUSATE SODIUM 100 MG/1
100 CAPSULE, LIQUID FILLED ORAL 2 TIMES DAILY
Status: DISCONTINUED | OUTPATIENT
Start: 2023-07-19 | End: 2023-07-20 | Stop reason: HOSPADM

## 2023-07-19 RX ORDER — OXYCODONE HYDROCHLORIDE 5 MG/1
5 TABLET ORAL EVERY 4 HOURS PRN
Status: DISCONTINUED | OUTPATIENT
Start: 2023-07-19 | End: 2023-07-20 | Stop reason: HOSPADM

## 2023-07-19 RX ORDER — POLYETHYLENE GLYCOL 3350 17 G/17G
17 POWDER, FOR SOLUTION ORAL DAILY
Status: DISCONTINUED | OUTPATIENT
Start: 2023-07-19 | End: 2023-07-20 | Stop reason: HOSPADM

## 2023-07-19 RX ORDER — IBUPROFEN 600 MG/1
600 TABLET ORAL EVERY 6 HOURS
Status: DISCONTINUED | OUTPATIENT
Start: 2023-07-20 | End: 2023-07-19

## 2023-07-19 RX ORDER — MISOPROSTOL 100 UG/1
600 TABLET ORAL ONCE AS NEEDED
Status: DISCONTINUED | OUTPATIENT
Start: 2023-07-19 | End: 2023-07-20 | Stop reason: HOSPADM

## 2023-07-19 RX ORDER — ONDANSETRON 2 MG/ML
4 INJECTION INTRAMUSCULAR; INTRAVENOUS EVERY 6 HOURS PRN
Status: DISCONTINUED | OUTPATIENT
Start: 2023-07-19 | End: 2023-07-20 | Stop reason: HOSPADM

## 2023-07-19 RX ORDER — ACETAMINOPHEN 500 MG
1000 TABLET ORAL EVERY 6 HOURS
Status: DISPENSED | OUTPATIENT
Start: 2023-07-19 | End: 2023-07-20

## 2023-07-19 RX ORDER — HYDROCORTISONE 25 MG/G
CREAM TOPICAL 3 TIMES DAILY PRN
Status: DISCONTINUED | OUTPATIENT
Start: 2023-07-19 | End: 2023-07-20 | Stop reason: HOSPADM

## 2023-07-19 RX ORDER — METHYLERGONOVINE MALEATE 0.2 MG/ML
200 INJECTION INTRAVENOUS ONCE AS NEEDED
Status: DISCONTINUED | OUTPATIENT
Start: 2023-07-19 | End: 2023-07-20 | Stop reason: HOSPADM

## 2023-07-19 RX ORDER — ACETAMINOPHEN 325 MG/1
650 TABLET ORAL EVERY 4 HOURS PRN
Status: DISCONTINUED | OUTPATIENT
Start: 2023-07-19 | End: 2023-07-20 | Stop reason: HOSPADM

## 2023-07-19 RX ORDER — IBUPROFEN 800 MG/1
800 TABLET ORAL EVERY 8 HOURS SCHEDULED
Status: DISCONTINUED | OUTPATIENT
Start: 2023-07-19 | End: 2023-07-20 | Stop reason: HOSPADM

## 2023-07-19 RX ORDER — ONDANSETRON 2 MG/ML
4 INJECTION INTRAMUSCULAR; INTRAVENOUS ONCE AS NEEDED
Status: DISCONTINUED | OUTPATIENT
Start: 2023-07-19 | End: 2023-07-20 | Stop reason: HOSPADM

## 2023-07-19 RX ORDER — NALOXONE HCL 0.4 MG/ML
0.1 VIAL (ML) INJECTION
Status: ACTIVE | OUTPATIENT
Start: 2023-07-19 | End: 2023-07-20

## 2023-07-19 RX ADMIN — PRENATAL VIT W/ FE FUMARATE-FA TAB 27-0.8 MG 1 TABLET: 27-0.8 TAB at 08:49

## 2023-07-19 RX ADMIN — OXYCODONE HYDROCHLORIDE 5 MG: 5 TABLET ORAL at 06:17

## 2023-07-19 RX ADMIN — SIMETHICONE 80 MG: 80 TABLET, CHEWABLE ORAL at 12:33

## 2023-07-19 RX ADMIN — IBUPROFEN 800 MG: 800 TABLET, FILM COATED ORAL at 21:21

## 2023-07-19 RX ADMIN — HYDROCODONE BITARTRATE AND ACETAMINOPHEN 1 TABLET: 7.5; 325 TABLET ORAL at 00:50

## 2023-07-19 RX ADMIN — ACETAMINOPHEN 1000 MG: 500 TABLET ORAL at 08:49

## 2023-07-19 RX ADMIN — DOCUSATE SODIUM 100 MG: 100 CAPSULE, LIQUID FILLED ORAL at 08:49

## 2023-07-19 RX ADMIN — SIMETHICONE 80 MG: 80 TABLET, CHEWABLE ORAL at 06:17

## 2023-07-19 RX ADMIN — OXYCODONE HYDROCHLORIDE 10 MG: 10 TABLET ORAL at 21:21

## 2023-07-19 RX ADMIN — ACETAMINOPHEN 1000 MG: 500 TABLET ORAL at 00:50

## 2023-07-19 RX ADMIN — KETOROLAC TROMETHAMINE 15 MG: 30 INJECTION, SOLUTION INTRAMUSCULAR; INTRAVENOUS at 08:50

## 2023-07-19 RX ADMIN — IBUPROFEN 800 MG: 800 TABLET, FILM COATED ORAL at 15:11

## 2023-07-19 RX ADMIN — KETOROLAC TROMETHAMINE 15 MG: 30 INJECTION, SOLUTION INTRAMUSCULAR; INTRAVENOUS at 00:51

## 2023-07-19 RX ADMIN — SIMETHICONE 80 MG: 80 TABLET, CHEWABLE ORAL at 00:51

## 2023-07-19 RX ADMIN — POLYETHYLENE GLYCOL (3350) 17 G: 17 POWDER, FOR SOLUTION ORAL at 08:49

## 2023-07-19 RX ADMIN — ACETAMINOPHEN 650 MG: 325 TABLET ORAL at 03:12

## 2023-07-19 NOTE — PAYOR COMM NOTE
"CONTACT:  RADHA BALL MSN, RN  UTILIZATION MANAGEMENT DEPT.  [facility]  [address]  [place], [address]  PHONE:  [phone]  FAX: [phone]    INPATIENT AUTHORIZATION REQUEST    Salina Araujo (27 y.o. Female)       Date of Birth   1995    Social Security Number       Address   [facility] [address]    Home Phone   [phone]    MRN   [id number]       Bahai   Unknown    Marital Status   Unknown                            Admission Date   23    Admission Type   Elective    Admitting Provider   Pramod Bryant DO    Attending Provider   Yanet Julian DO    Department, Room/Bed   [facility], W250/1       Discharge Date       Discharge Disposition       Discharge Destination                                 Attending Provider: Yanet Julian DO    Allergies: No Known Allergies    Isolation: None   Infection: None   Code Status: CPR    Ht: 177.8 cm (70\")   Wt: 113 kg (249 lb 14.4 oz)    Admission Cmt: None   Principal Problem: Pregnancy [Z34.90]                   Active Insurance as of 2023       Primary Coverage       Payor Plan Insurance Group Employer/Plan Group    [employer] [place] [employer]ICA[employer]        Payor Plan Address Payor Plan Phone Number Payor Plan Fax Number Effective Dates    [address] [address] [phone]  2023 - None Entered    [facility] [id number]         Subscriber Name Subscriber Birth Date Member ID       SALINA ARAUJO 1995 [id number]                     Emergency Contacts            No emergency contacts on file.          DELIVERY INFORMATION:    ADMIT DATE: 23    DELIVERY DATE AND TIME: 23 @ 2114    DELIVERY TYPE:     GENDER OF BABY: FEMALE    WEIGHT:  3240 GRAMS    APGARS:  8/8    NURSERY TYPE: WELL BABY    GESTATIONAL AGE: 39/0    EDC: 23    /PARA: 1/1       History & Physical        Kassi Martinez MD at 23 1001          BH " Kartik  Obstetric History and Physical    Chief Complaint   Patient presents with    Scheduled Induction     PT ARRIVED TO L/D FOR SCHEDULED TERM CYTOTEC INDUCTION.       Subjective     Patient is a 27 y.o. female  currently at 39w0d, who presents with elective IOL at term.    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     She denies history of asthma, HTN, HSV.      Prenatal Information:   Maternal Prenatal Labs  Blood Type ABO Type   Date Value Ref Range Status   2023 A  Final      Rh Status RH type   Date Value Ref Range Status   2023 Positive  Final      Antibody Screen Antibody Screen   Date Value Ref Range Status   2023 Negative  Final      Rapid Urin Drug Screen Barbiturates Screen, Urine   Date Value Ref Range Status   2023 Negative Negative Final     Benzodiazepine Screen, Urine   Date Value Ref Range Status   2023 Negative Negative Final     Methadone Screen, Urine   Date Value Ref Range Status   2023 Negative Negative Final     Opiate Screen   Date Value Ref Range Status   2023 Negative Negative Final     THC, Screen, Urine   Date Value Ref Range Status   2023 Negative Negative Final     Cocaine Screen, Urine   Date Value Ref Range Status   2023 Negative Negative Final     Amphetamine Screen, Urine   Date Value Ref Range Status   2023 Negative Negative Final     Propoxyphene Screen   Date Value Ref Range Status   2023 Negative Negative Final     Buprenorphine, Screen, Urine   Date Value Ref Range Status   2023 Negative Negative Final     Methamphetamine, Ur   Date Value Ref Range Status   2023 Negative Negative Final     Oxycodone Screen, Urine   Date Value Ref Range Status   2023 Negative Negative Final     Tricyclic Antidepressants  Screen   Date Value Ref Range Status   2023 Negative Negative Final      Group B Strep Culture No results found for: GBSANTIGEN           External Prenatal Results       Pregnancy Outside Results - Transcribed From Office Records - See Scanned Records For Details       Test Value Date Time    ABO  A  23    Rh  Positive  23    Antibody Screen  Negative  23    Varicella IgG       Rubella ^ Non-Immune  23     Hgb  13.3 g/dL 23    Hct  41.5 % 23    Glucose Fasting GTT       Glucose Tolerance Test 1 hour       Glucose Tolerance Test 3 hour       Gonorrhea (discrete)       Chlamydia (discrete)       RPR ^ Non-Reactive  23     VDRL       Syphilis Antibody       HBsAg ^ Negative  23     Herpes Simplex Virus PCR       Herpes Simplex VIrus Culture       HIV ^ Non-Reactive  23     Hep C RNA Quant PCR       Hep C Antibody       AFP       Group B Strep ^ Negative  23     GBS Susceptibility to Clindamycin       GBS Susceptibility to Erythromycin       Fetal Fibronectin       Genetic Testing, Maternal Blood                 Drug Screening       Test Value Date Time    Urine Drug Screen       Amphetamine Screen  Negative  23    Barbiturate Screen  Negative  23    Benzodiazepine Screen  Negative  23    Methadone Screen  Negative  23    Phencyclidine Screen  Negative  23    Opiates Screen  Negative  23    THC Screen  Negative  23    Cocaine Screen       Propoxyphene Screen  Negative  23    Buprenorphine Screen  Negative  23    Methamphetamine Screen       Oxycodone Screen  Negative  23    Tricyclic Antidepressants Screen  Negative  23              Legend    ^: Historical                              Past OB History:     OB History    Para Term  AB Living   1 0 0 0 0 0   SAB IAB Ectopic Molar Multiple Live Births    0 0 0 0 0 0      # Outcome Date GA Lbr Vadim/2nd Weight Sex Delivery Anes PTL Lv   1 Current                Past Medical History: Past Medical History:   Diagnosis Date    Anxiety     Asthma     Depression     Klippel-Feil syndrome       Past Surgical History No past surgical history on file.   Family History: No family history on file.   Social History:  has no history on file for tobacco use.   has no history on file for alcohol use.   has no history on file for drug use.        Review of Systems  Complete ROS including constitutional, skin, HENT, Eyes, CV, Resp, GI, , MS, Endo/heme/allergies, Neuro, and Psych is negative unless otherwise indicated above or detailed in HPI      Objective     Vital Signs Range for the last 24 hours  Temperature: Temp:  [98.9 °F (37.2 °C)] 98.9 °F (37.2 °C)   Temp Source:     BP: BP: (106-127)/(56-71) 106/56   Pulse: Heart Rate:  [78-92] 78   Respirations:     Weight: Weight:  [113 kg (249 lb 14.4 oz)] 113 kg (249 lb 14.4 oz)     Physical Examination:    Gen: NAD   CV: regular rate   Resp:  normal work of breathing on RA   Abd: soft, nontender, gravid   Extr: no edema bilaterally     Presentation: Vertex on BSUS        Laboratory Results:    Latest Reference Range & Units 07/17/23 22:23   ABO Type  A   RH type  Positive   Antibody Screen  Negative   T&S Expiration Date  7/20/2023 11:59:59 PM   WBC 3.40 - 10.80 10*3/mm3 10.03   RBC 3.77 - 5.28 10*6/mm3 4.65   Hemoglobin 12.0 - 15.9 g/dL 13.3   Hematocrit 34.0 - 46.6 % 41.5   Platelets 140 - 450 10*3/mm3 230   RDW 12.3 - 15.4 % 13.4   MCV 79.0 - 97.0 fL 89.2   MCH 26.6 - 33.0 pg 28.6   MCHC 31.5 - 35.7 g/dL 32.0   MPV 6.0 - 12.0 fL 10.6   RDW-SD 37.0 - 54.0 fl 43.8   Neutrophil Rel % 42.7 - 76.0 % 66.1   Lymphocyte Rel % 19.6 - 45.3 % 24.9   Monocyte Rel % 5.0 - 12.0 % 7.2   Eosinophil Rel % 0.3 - 6.2 % 1.0   Basophil Rel % 0.0 - 1.5 % 0.3   Immature Granulocyte Rel % 0.0 - 0.5 % 0.5   Neutrophils Absolute 1.70 - 7.00 10*3/mm3 6.63    Lymphocytes Absolute 0.70 - 3.10 10*3/mm3 2.50   Monocytes Absolute 0.10 - 0.90 10*3/mm3 0.72   Eosinophils Absolute 0.00 - 0.40 10*3/mm3 0.10   Basophils Absolute 0.00 - 0.20 10*3/mm3 0.03   Immature Grans, Absolute 0.00 - 0.05 10*3/mm3 0.05   nRBC 0.0 - 0.2 /100 WBC 0.0       Assessment/Plan:  Pt is 27 y.o.  39w0d admitted for IOL   1. Admit to L&D  2. IOL: cytotec to pitocin  3. CEFM  4. GBS: neg - no ppx indicated    Kassi Martinez MD  2023  10:01 EDT      Electronically signed by Kassi Martinez MD at 23 1004          Operative/Procedure Notes (all)        Kassi Martinez MD at 23 2110  Version 1 of [address] [place] [address]   Section Operative Note     Pre-Operative Dx:   1.Intrauterine pregnancy at 39w0d  2.Non-reassuring fetal surveillance remote from delivery           Postoperative dx:    1.Intrauterine pregnancy at 39w0d  2.Non-reassuring fetal surveillance remote from delivery      Procedure: Procedure(s):   SECTION PRIMARY   Surgeon:   Kassi Mratinez MD         Anesthesia: Epidural    EBL:   mls.  500  mls.                          I/O this shift:  In: -   Out: 500 [Blood:500]   Antibiotics: cefazolin (Ancef) and azithromycin      Infant:         Name:                   Gender: female  infant     Weight: No birth weight on file.      Apgars:  8 @ 1 minute /        8 @ 5 minutes                                 Procedure Details:  The patient was taken to the operating room given regional anesthesia, prepped and draped in the usual sterile fashion. A  pfannenstiel incision was made with a scalpel and carried down sharply to the underlying layer of fascia with a scalpel. The fascia was incised in the midline and  the incision was extended laterally using curved Arreaga scissors.superior aspect of the incision was grasped with Kocher clamps and the underlying rectus muscles were dissected off with sharp and blunt dissection.  The rectus muscles were   in the midline and the peritoneal incision was entered bluntly. The peritoneal incision was extended superiorly and inferiorly with good visualization of the bladder. We inserted a bladder blade, and a bladder flap was created. The lower uterine segment was incised in a transverse fashion and the incision was extended laterally with blunt dissection. The baby was then delivered in an atraumatic fashion. The mouth and nose were suctioned and the infant was vigorous and gave a good cry. The baby was handed off to the waiting nursery staff. Next the placenta was delivered spontaneously.  The uterus was exteriorized and cleared of all clots and debris. The uterine incision was closed with two layers of 0 vicryl. The second layer was an imbricating stitch. There was some ongoing bleeding from the left aspect of the hysterotomy requiring placement of additional figure of 8 sutures, Additional figure of 8 sutures were placed in the midline and right aspect of the hysterotomy. There was some bleeding from the fimbriae of the left fallopian tube for which bovie cautery was used for hemostasis.  The uterus was then returned back to the peritoneal cavity and the gutters were cleared of all clots and debris. Hysterotomy was examined and noted to be hemostatic. The fascia was then closed with a running stitch of 0 Vicryl. We irrigated the subcutaneous fatty tissue and closed it with a 3-0 plain suture. The skin was closed with a 4-0 Vicryl in a subcuticular fashion and surgical adhesive was placed over the incision.  Sponge lap and needle counts were correct.            Complications: none       Grafts or Implants: NA      Disposition:   Mother to recovery room in stable condition             Kassi Martinez MD  2023  22:00 EDT           Electronically signed by Kassi Martinez MD at 23 7660       Kassi Martinez MD at 23 1834  Version 1 of [address] [place] [address]   Section  Operative Note    Pre-Operative Dx:   1.Intrauterine pregnancy at 39w0d  2.Non-reassuring fetal surveillance remote from delivery         Postoperative dx:    1.Intrauterine pregnancy at 39w0d  2.Non-reassuring fetal surveillance remote from delivery     Procedure: Procedure(s):   SECTION PRIMARY   Surgeon:   Kassi Martinez MD       Anesthesia: Epidural    EBL:   mls.  500  mls.                  I/O this shift:  In: -   Out: 500 [Blood:500]   Antibiotics: cefazolin (Ancef) and azithromycin     Infant:      Name:         Gender: female  infant    Weight: No birth weight on file.     Apgars:  8 @ 1 minute /      8 @ 5 minutes                       Procedure Details:  The patient was taken to the operating room given regional anesthesia, prepped and draped in the usual sterile fashion. A  pfannenstiel incision was made with a scalpel and carried down sharply to the underlying layer of fascia with a scalpel. The fascia was incised in the midline and  the incision was extended laterally using curved Arreaga scissors.superior aspect of the incision was grasped with Kocher clamps and the underlying rectus muscles were dissected off with sharp and blunt dissection.  The rectus muscles were  in the midline and the peritoneal incision was entered bluntly. The peritoneal incision was extended superiorly and inferiorly with good visualization of the bladder. We inserted a bladder blade, and a bladder flap was created. The lower uterine segment was incised in a transverse fashion and the incision was extended laterally with blunt dissection. The baby was then delivered in an atraumatic fashion. The mouth and nose were suctioned and the infant was vigorous and gave a good cry. The baby was handed off to the waiting nursery staff. Next the placenta was delivered spontaneously.  The uterus was exteriorized and cleared of all clots and debris. The uterine incision was closed with two layers of 0 vicryl. The  second layer was an imbricating stitch. There was some ongoing bleeding from the left aspect of the hysterotomy requiring placement of additional figure of 8 sutures, Additional figure of 8 sutures were placed in the midline and right aspect of the hysterotomy. There was some bleeding from the fimbriae of the left fallopian tube for which bovie cautery was used for hemostasis.  The uterus was then returned back to the peritoneal cavity and the gutters were cleared of all clots and debris. Hysterotomy was examined and noted to be hemostatic. The fascia was then closed with a running stitch of 0 Vicryl. We irrigated the subcutaneous fatty tissue and closed it with a 3-0 plain suture. The skin was closed with a 4-0 Vicryl in a subcuticular fashion and surgical adhesive was placed over the incision.  Sponge lap and needle counts were correct.          Complications: none      Grafts or Implants: NA     Disposition:   Mother to recovery room in stable condition          Kassi Martinez MD  7/18/2023  22:00 EDT     Electronically signed by Kassi Martinez MD at 07/18/23 [id number]

## 2023-07-19 NOTE — PROGRESS NOTES
OB/GYN Progress Note    In to see pt    Pt undergoing term IOL  Pt is s/p cytotecx3, ludwig bulb in place, pitocin on. Cvx still 1cm.   FHR now with recurrent late decels, improved with repositioning.   Discussed concerns for NRFS remote from vaginal delivery, recc proceeding toward delivery via CS.   Risks/benefits of CS reviewed. Consent obtained.     Ancef and azithromycin for infection ppx.     Kassi Martinez MD

## 2023-07-19 NOTE — PROGRESS NOTES
"SUNITA Verdugo   PROGRESS NOTE    Post-Op Day 1 S/P   Subjective   Subjective  Patient reports:  Pain is controlled with prescription NSAID's including ibuprofen (Motrin) and narcotic analgesics including oxycodone/acetaminophen (Percocet, Tylox).  She is up out of bed this am. Tolerating diet. Tolerating po -- normal. Voiding - without difficulty; flatus reported..  Vaginal bleeding is as much as expected.    Objective    Objective:  Vital signs (most recent): Blood pressure 122/65, pulse 89, temperature 98.1 °F (36.7 °C), temperature source Oral, resp. rate 16, height 177.8 cm (70\"), weight 113 kg (249 lb 14.4 oz), SpO2 98 %, currently breastfeeding.     Vitals: Vital Signs Range for the last 24 hours  Temperature: Temp:  [97.8 °F (36.6 °C)-98.2 °F (36.8 °C)] 98.1 °F (36.7 °C)   Temp Source: Temp src: Oral   BP: BP: ()/(53-97) 122/65   Pulse: Heart Rate:  [] 89   Respirations: Resp:  [16-20] 16   Weight:              Physical Exam    Lungs clear to auscultation bilaterally   Abdomen Soft, ND, tender along incision. + BS   Incision  healing well, no drainage   Extremities extremities normal, atraumatic, no cyanosis or edema         [unfilled]       Lab Results   Component Value Date    ABO A 2023    RH Positive 2023        Lab Results   Component Value Date    HGB 11.3 (L) 2023    HCT 35.5 2023       Assessment & Plan        Pregnancy    Postpartum care following  delivery    Assessment & Plan    Assessment:    Ashley Araujo is Day 1  post-partum    , Low Transverse     .      Plan:  continue post op care.      Rosemarie Montez, APRN  23  08:38 EDT  "

## 2023-07-19 NOTE — L&D DELIVERY NOTE
Kartik   Section Operative Note    Pre-Operative Dx:   1.Intrauterine pregnancy at 39w0d  2.Non-reassuring fetal surveillance remote from delivery         Postoperative dx:    1.Intrauterine pregnancy at 39w0d  2.Non-reassuring fetal surveillance remote from delivery     Procedure: Procedure(s):   SECTION PRIMARY   Surgeon:   Kassi Martinez MD       Anesthesia: Epidural    EBL:   mls.  500  mls.                  I/O this shift:  In: -   Out: 500 [Blood:500]   Antibiotics: cefazolin (Ancef) and azithromycin     Infant:      Name:         Gender: female  infant    Weight: No birth weight on file.     Apgars:  8 @ 1 minute /      8 @ 5 minutes                       Procedure Details:  The patient was taken to the operating room given regional anesthesia, prepped and draped in the usual sterile fashion. A  pfannenstiel incision was made with a scalpel and carried down sharply to the underlying layer of fascia with a scalpel. The fascia was incised in the midline and  the incision was extended laterally using curved Arreaga scissors.superior aspect of the incision was grasped with Kocher clamps and the underlying rectus muscles were dissected off with sharp and blunt dissection.  The rectus muscles were  in the midline and the peritoneal incision was entered bluntly. The peritoneal incision was extended superiorly and inferiorly with good visualization of the bladder. We inserted a bladder blade, and a bladder flap was created. The lower uterine segment was incised in a transverse fashion and the incision was extended laterally with blunt dissection. The baby was then delivered in an atraumatic fashion. The mouth and nose were suctioned and the infant was vigorous and gave a good cry. The baby was handed off to the waiting nursery staff. Next the placenta was delivered spontaneously.  The uterus was exteriorized and cleared of all clots and debris. The uterine incision was closed with two  layers of 0 vicryl. The second layer was an imbricating stitch. There was some ongoing bleeding from the left aspect of the hysterotomy requiring placement of additional figure of 8 sutures, Additional figure of 8 sutures were placed in the midline and right aspect of the hysterotomy. There was some bleeding from the fimbriae of the left fallopian tube for which bovie cautery was used for hemostasis.  The uterus was then returned back to the peritoneal cavity and the gutters were cleared of all clots and debris. Hysterotomy was examined and noted to be hemostatic. The fascia was then closed with a running stitch of 0 Vicryl. We irrigated the subcutaneous fatty tissue and closed it with a 3-0 plain suture. The skin was closed with a 4-0 Vicryl in a subcuticular fashion and surgical adhesive was placed over the incision.  Sponge lap and needle counts were correct.          Complications: none      Grafts or Implants: NA     Disposition:   Mother to recovery room in stable condition          Kassi Martinez MD  7/18/2023  22:00 EDT

## 2023-07-19 NOTE — PLAN OF CARE
Goal Outcome Evaluation:  Plan of Care Reviewed With: patient           Outcome Evaluation: ambulating well/ tolerating reg diet/ voiding without difficulty/ small rubra

## 2023-07-19 NOTE — PLAN OF CARE
Goal Outcome Evaluation:              Outcome Evaluation: PT HAD PRIMARY C/S. INCISION CLEAN DRY AND INTACT. VSS. FUNDUS FIRM WITH LIGHT VAGINAL BLEEDING. FOB AT BEDSIDE ATTENTIVE TO PATIENT.

## 2023-07-20 VITALS
OXYGEN SATURATION: 99 % | DIASTOLIC BLOOD PRESSURE: 72 MMHG | TEMPERATURE: 98 F | RESPIRATION RATE: 16 BRPM | BODY MASS INDEX: 35.78 KG/M2 | HEIGHT: 70 IN | HEART RATE: 76 BPM | WEIGHT: 249.9 LBS | SYSTOLIC BLOOD PRESSURE: 120 MMHG

## 2023-07-20 LAB
BASOPHILS # BLD AUTO: 0.03 10*3/MM3 (ref 0–0.2)
BASOPHILS NFR BLD AUTO: 0.3 % (ref 0–1.5)
DEPRECATED RDW RBC AUTO: 45.6 FL (ref 37–54)
EOSINOPHIL # BLD AUTO: 0.12 10*3/MM3 (ref 0–0.4)
EOSINOPHIL NFR BLD AUTO: 1.3 % (ref 0.3–6.2)
ERYTHROCYTE [DISTWIDTH] IN BLOOD BY AUTOMATED COUNT: 13.6 % (ref 12.3–15.4)
HCT VFR BLD AUTO: 35.8 % (ref 34–46.6)
HGB BLD-MCNC: 11.6 G/DL (ref 12–15.9)
IMM GRANULOCYTES # BLD AUTO: 0.04 10*3/MM3 (ref 0–0.05)
IMM GRANULOCYTES NFR BLD AUTO: 0.4 % (ref 0–0.5)
LYMPHOCYTES # BLD AUTO: 2.34 10*3/MM3 (ref 0.7–3.1)
LYMPHOCYTES NFR BLD AUTO: 26.1 % (ref 19.6–45.3)
MCH RBC QN AUTO: 29.5 PG (ref 26.6–33)
MCHC RBC AUTO-ENTMCNC: 32.4 G/DL (ref 31.5–35.7)
MCV RBC AUTO: 91.1 FL (ref 79–97)
MONOCYTES # BLD AUTO: 0.88 10*3/MM3 (ref 0.1–0.9)
MONOCYTES NFR BLD AUTO: 9.8 % (ref 5–12)
NEUTROPHILS NFR BLD AUTO: 5.55 10*3/MM3 (ref 1.7–7)
NEUTROPHILS NFR BLD AUTO: 62.1 % (ref 42.7–76)
NRBC BLD AUTO-RTO: 0 /100 WBC (ref 0–0.2)
PLATELET # BLD AUTO: 171 10*3/MM3 (ref 140–450)
PMV BLD AUTO: 10.7 FL (ref 6–12)
RBC # BLD AUTO: 3.93 10*6/MM3 (ref 3.77–5.28)
WBC NRBC COR # BLD: 8.96 10*3/MM3 (ref 3.4–10.8)

## 2023-07-20 PROCEDURE — 85025 COMPLETE CBC W/AUTO DIFF WBC: CPT | Performed by: OBSTETRICS & GYNECOLOGY

## 2023-07-20 RX ORDER — PSEUDOEPHEDRINE HCL 30 MG
100 TABLET ORAL 2 TIMES DAILY PRN
Qty: 40 CAPSULE | Refills: 1 | Status: SHIPPED | OUTPATIENT
Start: 2023-07-20

## 2023-07-20 RX ORDER — SIMETHICONE 80 MG
80 TABLET,CHEWABLE ORAL 4 TIMES DAILY
Qty: 40 TABLET | Refills: 1 | Status: SHIPPED | OUTPATIENT
Start: 2023-07-20

## 2023-07-20 RX ORDER — IBUPROFEN 800 MG/1
800 TABLET ORAL EVERY 8 HOURS PRN
Qty: 40 TABLET | Refills: 1 | Status: SHIPPED | OUTPATIENT
Start: 2023-07-20

## 2023-07-20 RX ORDER — OXYCODONE HYDROCHLORIDE 5 MG/1
5 TABLET ORAL EVERY 4 HOURS PRN
Qty: 10 TABLET | Refills: 0 | Status: SHIPPED | OUTPATIENT
Start: 2023-07-20 | End: 2023-07-23

## 2023-07-20 RX ADMIN — SIMETHICONE 80 MG: 80 TABLET, CHEWABLE ORAL at 06:07

## 2023-07-20 RX ADMIN — IBUPROFEN 800 MG: 800 TABLET, FILM COATED ORAL at 06:07

## 2023-07-20 RX ADMIN — SIMETHICONE 80 MG: 80 TABLET, CHEWABLE ORAL at 12:47

## 2023-07-20 RX ADMIN — SIMETHICONE 80 MG: 80 TABLET, CHEWABLE ORAL at 01:00

## 2023-07-20 RX ADMIN — POLYETHYLENE GLYCOL (3350) 17 G: 17 POWDER, FOR SOLUTION ORAL at 10:00

## 2023-07-20 RX ADMIN — DOCUSATE SODIUM 100 MG: 100 CAPSULE, LIQUID FILLED ORAL at 10:00

## 2023-07-20 RX ADMIN — PRENATAL VIT W/ FE FUMARATE-FA TAB 27-0.8 MG 1 TABLET: 27-0.8 TAB at 10:00

## 2023-07-20 RX ADMIN — ACETAMINOPHEN 650 MG: 325 TABLET ORAL at 01:03

## 2023-07-20 NOTE — DISCHARGE INSTRUCTIONS
No lifting pushing or pulling over 10-15 lbs for 6 weeks.  No tampons , douching, intercourse, nothing inside vagina for 6 weeks. You may shower but no tub baths or submersion in water for 6 weeks .  Run soapy water over incision site without scrubbing, rinse, and pat dry .  Inspect your incision daily for redness, drainage, hot hard areas, separation of incision and report any of these immediately to your doctor.  No driving for 2 weeks.  Notify  your doctor for fever, chills, worsening abdominal pain, vaginal bleeding heavier than a period or passing clots, swelling in your hands face or feet or visual changes such as blurry or spotty vision.  See attached education sheets.

## 2023-07-20 NOTE — PLAN OF CARE
Goal Outcome Evaluation:  Plan of Care Reviewed With: patient        Progress: improving  Outcome Evaluation: afebrile, voiding without difficulty/ small rubra/ ambulating well/ voices confidence in ability to care for self at home

## 2023-07-20 NOTE — PAYOR COMM NOTE
"CONTACT:  RADHA BALL MSN, RN  UTILIZATION MANAGEMENT DEPT.  [facility]  [address]  [facility], [address]  PHONE:  [phone]  FAX: [phone]    PATIENT DISCHARGED TO HOME ON 7/20/23    REFER TO AUTH # [id number]     Salina Araujo (27 y.o. Female)       Date of Birth   1995    Social Security Number       Address   [address] [place] [address]    Home Phone   [phone]    MRN   [id number]       Baptism   Unknown    Marital Status   Unknown                            Admission Date   7/17/23    Admission Type   Elective    Admitting Provider   Pramod Bryant DO    Attending Provider       Department, Room/Bed   [facility], W250/1       Discharge Date   7/20/2023    Discharge Disposition   Home or Self Care    Discharge Destination                                 Attending Provider: (none)   Allergies: No Known Allergies    Isolation: None   Infection: None   Code Status: CPR    Ht: 177.8 cm (70\")   Wt: 113 kg (249 lb 14.4 oz)    Admission Cmt: None   Principal Problem: Pregnancy [Z34.90]                   Active Insurance as of 7/17/2023       Primary Coverage       Payor Plan Insurance Group Employer/Plan Group    [employer] [place] [employer]ICA[employer]        Payor Plan Address Payor Plan Phone Number Payor Plan Fax Number Effective Dates    [address] [address] [phone]  2/1/2023 - None Entered    [facility] [id number]         Subscriber Name Subscriber Birth Date Member ID       SALINA ARAUJO 1995 [id number]                     Emergency Contacts            No emergency contacts on file.                 Discharge Summary        Yudy Schmitz APRN at 07/20/23 0918          [facility]  Delivery Discharge Summary    Primary OB Clinician:     EDC: Estimated Date of Delivery: 7/25/23    Gestational Age:39w0d    Antepartum complications: none    Date of Delivery: 7/18/2023   Time of Delivery: 9:14 PM     Delivered By:  Kassi Martinez " "    Delivery Type: , Low Transverse      Tubal Ligation: n/a    Baby:   female    Apgar:  8  @ 1 minute /   Apgar:  8  @ 5 minutes   Weight: 3240 g (7 lb 2.3 oz)     Anesthesia: Epidural;Spinal      Intrapartum complications: Failure to Progress    Rh Immune globulin given: not applicable    Subjective     Subjective   Post-Op Day 2 S/P   Subjective  Patient reports:  Pain is controlled with ibuprofen (OTC) and narcotic analgesics including Percocet.  She is up out of bed this am. Tolerating diet. Tolerating po -- normal. Voiding - without difficulty; flatus reported..  Vaginal bleeding is as much as expected.    Breastfeeding: without difficulty.    Objective    Objective:  Vital signs (most recent): Blood pressure 120/72, pulse 76, temperature 98 °F (36.7 °C), temperature source Oral, resp. rate 16, height 177.8 cm (70\"), weight 113 kg (249 lb 14.4 oz), SpO2 99 %, currently breastfeeding.  Vitals: Vital Signs Range for the last 24 hours  Temperature: Temp:  [97.5 °F (36.4 °C)-98 °F (36.7 °C)] 98 °F (36.7 °C)   Temp Source: Temp src: Oral   BP: BP: (116-120)/(61-72) 120/72   Pulse: Heart Rate:  [76-87] 76   Respirations: Resp:  [16-18] 16   Weight:          Physical Exam    Lungs clear to auscultation bilaterally   Abdomen Soft, ND, tender along incision. + BS   Incision  well approximated   Extremities extremities normal, atraumatic, no cyanosis + edema equal bilaterally no erythema or warmth.       [unfilled]       Lab Results   Component Value Date    ABO A 2023    RH Positive 2023        Lab Results   Component Value Date    HGB 11.6 (L) 2023    HCT 35.8 2023       Assesment and Plan:       Pregnancy    Postpartum care following  delivery    Assessment & Plan    Assessment:    Ashley Araujo is Day 2  post-partum    , Low Transverse         .      Plan:    Continue post op care and plan for discharge today.   Outpatient Lactation Referral ordered if needed " by patient.     Follow-up appointment with [facility]'s Care in 3 weeks.    Discharge Date: 7/20/2023; Discharge Time: 09:18 EDT        ALIN Kim  7/20/2023  09:18 EDT      Electronically signed by Yudy Schmitz APRN at 07/20/23 0918

## 2023-07-20 NOTE — DISCHARGE SUMMARY
"[facility]  Delivery Discharge Summary    Primary OB Clinician:     EDC: Estimated Date of Delivery: 23    Gestational Age:39w0d    Antepartum complications: none    Date of Delivery: 2023   Time of Delivery: 9:14 PM     Delivered By:  Kassi Martinez     Delivery Type: , Low Transverse      Tubal Ligation: n/a    Baby:   female    Apgar:  8  @ 1 minute /   Apgar:  8  @ 5 minutes   Weight: 3240 g (7 lb 2.3 oz)     Anesthesia: Epidural;Spinal      Intrapartum complications: Failure to Progress    Rh Immune globulin given: not applicable    Subjective     Subjective   Post-Op Day 2 S/P   Subjective  Patient reports:  Pain is controlled with ibuprofen (OTC) and narcotic analgesics including Percocet.  She is up out of bed this am. Tolerating diet. Tolerating po -- normal. Voiding - without difficulty; flatus reported..  Vaginal bleeding is as much as expected.    Breastfeeding: without difficulty.    Objective    Objective:  Vital signs (most recent): Blood pressure 120/72, pulse 76, temperature 98 °F (36.7 °C), temperature source Oral, resp. rate 16, height 177.8 cm (70\"), weight 113 kg (249 lb 14.4 oz), SpO2 99 %, currently breastfeeding.  Vitals: Vital Signs Range for the last 24 hours  Temperature: Temp:  [97.5 °F (36.4 °C)-98 °F (36.7 °C)] 98 °F (36.7 °C)   Temp Source: Temp src: Oral   BP: BP: (116-120)/(61-72) 120/72   Pulse: Heart Rate:  [76-87] 76   Respirations: Resp:  [16-18] 16   Weight:          Physical Exam    Lungs clear to auscultation bilaterally   Abdomen Soft, ND, tender along incision. + BS   Incision  well approximated   Extremities extremities normal, atraumatic, no cyanosis + edema equal bilaterally no erythema or warmth.       [unfilled]       Lab Results   Component Value Date    ABO A 2023    RH Positive 2023        Lab Results   Component Value Date    HGB 11.6 (L) 2023    HCT 35.8 2023       Assesment and Plan:       Pregnancy    Postpartum " care following  delivery    Assessment & Plan    Assessment:    Ashley Araujo is Day 2  post-partum    , Low Transverse         .      Plan:    Continue post op care and plan for discharge today.   Outpatient Lactation Referral ordered if needed by patient.     Follow-up appointment with [facility]'s Care in 3 weeks.    Discharge Date: 2023; Discharge Time: 09:18 EDT        ALIN Kim  2023  09:18 EDT

## 2023-07-20 NOTE — PLAN OF CARE
Goal Outcome Evaluation:           Progress: improving  Outcome Evaluation: vss. ambulating independently. voiding spontaneously. tolerating regular diet. fundus firm and midline, light lochia noted. incison cdi

## 2023-10-19 ENCOUNTER — APPOINTMENT (OUTPATIENT)
Dept: ULTRASOUND IMAGING | Facility: HOSPITAL | Age: 28
DRG: 418 | End: 2023-10-19
Payer: COMMERCIAL

## 2023-10-19 ENCOUNTER — HOSPITAL ENCOUNTER (INPATIENT)
Facility: HOSPITAL | Age: 28
LOS: 1 days | Discharge: HOME | DRG: 418 | End: 2023-10-20
Attending: EMERGENCY MEDICINE | Admitting: SURGERY
Payer: COMMERCIAL

## 2023-10-19 DIAGNOSIS — K80.00 CALCULUS OF GALLBLADDER WITH ACUTE CHOLECYSTITIS WITHOUT OBSTRUCTION: Primary | ICD-10-CM

## 2023-10-19 PROBLEM — K80.20 CHOLELITHIASIS: Status: ACTIVE | Noted: 2023-10-19

## 2023-10-19 LAB
ALBUMIN SERPL-MCNC: 5.2 G/DL (ref 3.5–5.2)
ALBUMIN/GLOB SERPL: 2.3 G/DL
ALP SERPL-CCNC: 68 U/L (ref 39–117)
ALT SERPL W P-5'-P-CCNC: 33 U/L (ref 1–33)
ANION GAP SERPL CALCULATED.3IONS-SCNC: 11.1 MMOL/L (ref 5–15)
AST SERPL-CCNC: 25 U/L (ref 1–32)
B-HCG UR QL: NEGATIVE
BASOPHILS # BLD AUTO: 0.06 10*3/MM3 (ref 0–0.2)
BASOPHILS NFR BLD AUTO: 0.6 % (ref 0–1.5)
BILIRUB SERPL-MCNC: 0.2 MG/DL (ref 0–1.2)
BILIRUB UR QL STRIP: NEGATIVE
BUN SERPL-MCNC: 13 MG/DL (ref 6–20)
BUN/CREAT SERPL: 16.5 (ref 7–25)
CALCIUM SPEC-SCNC: 9.6 MG/DL (ref 8.6–10.5)
CHLORIDE SERPL-SCNC: 106 MMOL/L (ref 98–107)
CLARITY UR: CLEAR
CO2 SERPL-SCNC: 23.9 MMOL/L (ref 22–29)
COLOR UR: YELLOW
CREAT SERPL-MCNC: 0.79 MG/DL (ref 0.57–1)
DEPRECATED RDW RBC AUTO: 41.1 FL (ref 37–54)
EGFRCR SERPLBLD CKD-EPI 2021: 104.6 ML/MIN/1.73
EOSINOPHIL # BLD AUTO: 0.09 10*3/MM3 (ref 0–0.4)
EOSINOPHIL NFR BLD AUTO: 0.9 % (ref 0.3–6.2)
ERYTHROCYTE [DISTWIDTH] IN BLOOD BY AUTOMATED COUNT: 12.9 % (ref 12.3–15.4)
FLUAV RNA RESP QL NAA+PROBE: NOT DETECTED
FLUBV RNA RESP QL NAA+PROBE: NOT DETECTED
GLOBULIN UR ELPH-MCNC: 2.3 GM/DL
GLUCOSE SERPL-MCNC: 95 MG/DL (ref 65–99)
GLUCOSE UR STRIP-MCNC: NEGATIVE MG/DL
HCT VFR BLD AUTO: 46.7 % (ref 34–46.6)
HGB BLD-MCNC: 15.7 G/DL (ref 12–15.9)
HGB UR QL STRIP.AUTO: NEGATIVE
IMM GRANULOCYTES # BLD AUTO: 0.04 10*3/MM3 (ref 0–0.05)
IMM GRANULOCYTES NFR BLD AUTO: 0.4 % (ref 0–0.5)
KETONES UR QL STRIP: NEGATIVE
LEUKOCYTE ESTERASE UR QL STRIP.AUTO: NEGATIVE
LYMPHOCYTES # BLD AUTO: 2.84 10*3/MM3 (ref 0.7–3.1)
LYMPHOCYTES NFR BLD AUTO: 28.8 % (ref 19.6–45.3)
MCH RBC QN AUTO: 29.3 PG (ref 26.6–33)
MCHC RBC AUTO-ENTMCNC: 33.6 G/DL (ref 31.5–35.7)
MCV RBC AUTO: 87.1 FL (ref 79–97)
MONOCYTES # BLD AUTO: 0.61 10*3/MM3 (ref 0.1–0.9)
MONOCYTES NFR BLD AUTO: 6.2 % (ref 5–12)
NEUTROPHILS NFR BLD AUTO: 6.21 10*3/MM3 (ref 1.7–7)
NEUTROPHILS NFR BLD AUTO: 63.1 % (ref 42.7–76)
NITRITE UR QL STRIP: NEGATIVE
NRBC BLD AUTO-RTO: 0 /100 WBC (ref 0–0.2)
PH UR STRIP.AUTO: 6 [PH] (ref 5–8)
PLATELET # BLD AUTO: 288 10*3/MM3 (ref 140–450)
PMV BLD AUTO: 10.8 FL (ref 6–12)
POTASSIUM SERPL-SCNC: 4.5 MMOL/L (ref 3.5–5.2)
PROT SERPL-MCNC: 7.5 G/DL (ref 6–8.5)
PROT UR QL STRIP: NEGATIVE
RBC # BLD AUTO: 5.36 10*6/MM3 (ref 3.77–5.28)
SARS-COV-2 RNA RESP QL NAA+PROBE: NOT DETECTED
SODIUM SERPL-SCNC: 141 MMOL/L (ref 136–145)
SP GR UR STRIP: 1.02 (ref 1–1.03)
UROBILINOGEN UR QL STRIP: NORMAL
WBC NRBC COR # BLD: 9.85 10*3/MM3 (ref 3.4–10.8)

## 2023-10-19 PROCEDURE — 25810000003 LACTATED RINGERS PER 1000 ML: Performed by: SURGERY

## 2023-10-19 PROCEDURE — 85025 COMPLETE CBC W/AUTO DIFF WBC: CPT | Performed by: PHYSICIAN ASSISTANT

## 2023-10-19 PROCEDURE — 25010000002 PIPERACILLIN SOD-TAZOBACTAM PER 1 G: Performed by: SURGERY

## 2023-10-19 PROCEDURE — 96376 TX/PRO/DX INJ SAME DRUG ADON: CPT

## 2023-10-19 PROCEDURE — 96374 THER/PROPH/DIAG INJ IV PUSH: CPT

## 2023-10-19 PROCEDURE — 25010000002 ONDANSETRON PER 1 MG

## 2023-10-19 PROCEDURE — 99285 EMERGENCY DEPT VISIT HI MDM: CPT

## 2023-10-19 PROCEDURE — 81025 URINE PREGNANCY TEST: CPT | Performed by: PHYSICIAN ASSISTANT

## 2023-10-19 PROCEDURE — 25010000002 MORPHINE PER 10 MG: Performed by: SURGERY

## 2023-10-19 PROCEDURE — 25010000002 HYDROMORPHONE 1 MG/ML SOLUTION: Performed by: EMERGENCY MEDICINE

## 2023-10-19 PROCEDURE — 87636 SARSCOV2 & INF A&B AMP PRB: CPT | Performed by: PHYSICIAN ASSISTANT

## 2023-10-19 PROCEDURE — 25010000002 ONDANSETRON PER 1 MG: Performed by: SURGERY

## 2023-10-19 PROCEDURE — 81003 URINALYSIS AUTO W/O SCOPE: CPT | Performed by: EMERGENCY MEDICINE

## 2023-10-19 PROCEDURE — 36415 COLL VENOUS BLD VENIPUNCTURE: CPT

## 2023-10-19 PROCEDURE — 96361 HYDRATE IV INFUSION ADD-ON: CPT

## 2023-10-19 PROCEDURE — 80053 COMPREHEN METABOLIC PANEL: CPT | Performed by: PHYSICIAN ASSISTANT

## 2023-10-19 PROCEDURE — 96375 TX/PRO/DX INJ NEW DRUG ADDON: CPT

## 2023-10-19 PROCEDURE — 76705 ECHO EXAM OF ABDOMEN: CPT

## 2023-10-19 RX ORDER — SODIUM CHLORIDE 9 MG/ML
40 INJECTION, SOLUTION INTRAVENOUS AS NEEDED
Status: DISCONTINUED | OUTPATIENT
Start: 2023-10-19 | End: 2023-10-20 | Stop reason: HOSPADM

## 2023-10-19 RX ORDER — FOLIC ACID 1 MG/1
1 TABLET ORAL DAILY
Status: ON HOLD | COMMUNITY
End: 2023-10-19

## 2023-10-19 RX ORDER — ONDANSETRON 2 MG/ML
4 INJECTION INTRAMUSCULAR; INTRAVENOUS ONCE
Status: COMPLETED | OUTPATIENT
Start: 2023-10-19 | End: 2023-10-19

## 2023-10-19 RX ORDER — SODIUM CHLORIDE 0.9 % (FLUSH) 0.9 %
3-10 SYRINGE (ML) INJECTION AS NEEDED
Status: DISCONTINUED | OUTPATIENT
Start: 2023-10-19 | End: 2023-10-20 | Stop reason: HOSPADM

## 2023-10-19 RX ORDER — ONDANSETRON 2 MG/ML
4 INJECTION INTRAMUSCULAR; INTRAVENOUS ONCE
Status: DISCONTINUED | OUTPATIENT
Start: 2023-10-19 | End: 2023-10-19

## 2023-10-19 RX ORDER — SODIUM CHLORIDE, SODIUM LACTATE, POTASSIUM CHLORIDE, CALCIUM CHLORIDE 600; 310; 30; 20 MG/100ML; MG/100ML; MG/100ML; MG/100ML
100 INJECTION, SOLUTION INTRAVENOUS CONTINUOUS
Status: DISCONTINUED | OUTPATIENT
Start: 2023-10-19 | End: 2023-10-20 | Stop reason: HOSPADM

## 2023-10-19 RX ORDER — MORPHINE SULFATE 2 MG/ML
2 INJECTION, SOLUTION INTRAMUSCULAR; INTRAVENOUS
Status: DISCONTINUED | OUTPATIENT
Start: 2023-10-19 | End: 2023-10-20 | Stop reason: HOSPADM

## 2023-10-19 RX ORDER — ONDANSETRON 2 MG/ML
4 INJECTION INTRAMUSCULAR; INTRAVENOUS EVERY 4 HOURS PRN
Status: DISCONTINUED | OUTPATIENT
Start: 2023-10-19 | End: 2023-10-20 | Stop reason: HOSPADM

## 2023-10-19 RX ORDER — SODIUM CHLORIDE 0.9 % (FLUSH) 0.9 %
3 SYRINGE (ML) INJECTION EVERY 12 HOURS SCHEDULED
Status: DISCONTINUED | OUTPATIENT
Start: 2023-10-19 | End: 2023-10-20 | Stop reason: HOSPADM

## 2023-10-19 RX ORDER — CITALOPRAM 20 MG/1
10 TABLET ORAL DAILY
Status: CANCELLED | OUTPATIENT
Start: 2023-10-20

## 2023-10-19 RX ORDER — ONDANSETRON 2 MG/ML
INJECTION INTRAMUSCULAR; INTRAVENOUS
Status: COMPLETED
Start: 2023-10-19 | End: 2023-10-19

## 2023-10-19 RX ORDER — CITALOPRAM HYDROBROMIDE 10 MG/1
10 TABLET ORAL DAILY
COMMUNITY

## 2023-10-19 RX ADMIN — ONDANSETRON 4 MG: 2 INJECTION INTRAMUSCULAR; INTRAVENOUS at 22:38

## 2023-10-19 RX ADMIN — ONDANSETRON 4 MG: 2 INJECTION INTRAMUSCULAR; INTRAVENOUS at 19:54

## 2023-10-19 RX ADMIN — Medication 3 ML: at 22:44

## 2023-10-19 RX ADMIN — SODIUM CHLORIDE, POTASSIUM CHLORIDE, SODIUM LACTATE AND CALCIUM CHLORIDE 100 ML/HR: 600; 310; 30; 20 INJECTION, SOLUTION INTRAVENOUS at 22:45

## 2023-10-19 RX ADMIN — PIPERACILLIN SODIUM AND TAZOBACTAM SODIUM 3.38 G: 3; .375 INJECTION, POWDER, LYOPHILIZED, FOR SOLUTION INTRAVENOUS at 22:43

## 2023-10-19 RX ADMIN — MORPHINE SULFATE 2 MG: 2 INJECTION, SOLUTION INTRAMUSCULAR; INTRAVENOUS at 22:38

## 2023-10-19 RX ADMIN — HYDROMORPHONE HYDROCHLORIDE 1 MG: 1 INJECTION, SOLUTION INTRAMUSCULAR; INTRAVENOUS; SUBCUTANEOUS at 20:05

## 2023-10-20 ENCOUNTER — ANESTHESIA EVENT (OUTPATIENT)
Dept: PERIOP | Facility: HOSPITAL | Age: 28
End: 2023-10-20
Payer: COMMERCIAL

## 2023-10-20 ENCOUNTER — APPOINTMENT (OUTPATIENT)
Dept: GENERAL RADIOLOGY | Facility: HOSPITAL | Age: 28
DRG: 418 | End: 2023-10-20
Payer: COMMERCIAL

## 2023-10-20 ENCOUNTER — ANESTHESIA (OUTPATIENT)
Dept: PERIOP | Facility: HOSPITAL | Age: 28
End: 2023-10-20
Payer: COMMERCIAL

## 2023-10-20 VITALS
HEART RATE: 70 BPM | HEIGHT: 70 IN | SYSTOLIC BLOOD PRESSURE: 109 MMHG | DIASTOLIC BLOOD PRESSURE: 76 MMHG | WEIGHT: 221.78 LBS | TEMPERATURE: 98.5 F | OXYGEN SATURATION: 99 % | BODY MASS INDEX: 31.75 KG/M2 | RESPIRATION RATE: 18 BRPM

## 2023-10-20 PROCEDURE — 25810000003 LACTATED RINGERS PER 1000 ML: Performed by: NURSE ANESTHETIST, CERTIFIED REGISTERED

## 2023-10-20 PROCEDURE — 25010000002 ONDANSETRON PER 1 MG: Performed by: SURGERY

## 2023-10-20 PROCEDURE — 96376 TX/PRO/DX INJ SAME DRUG ADON: CPT

## 2023-10-20 PROCEDURE — 99222 1ST HOSP IP/OBS MODERATE 55: CPT | Performed by: SURGERY

## 2023-10-20 PROCEDURE — 25010000002 NEOSTIGMINE 10 MG/10ML SOLUTION: Performed by: NURSE ANESTHETIST, CERTIFIED REGISTERED

## 2023-10-20 PROCEDURE — 25810000003 LACTATED RINGERS PER 1000 ML: Performed by: SURGERY

## 2023-10-20 PROCEDURE — 25010000002 FENTANYL CITRATE (PF) 50 MCG/ML SOLUTION: Performed by: NURSE ANESTHETIST, CERTIFIED REGISTERED

## 2023-10-20 PROCEDURE — 96361 HYDRATE IV INFUSION ADD-ON: CPT

## 2023-10-20 PROCEDURE — 25010000002 PROPOFOL 10 MG/ML EMULSION: Performed by: NURSE ANESTHETIST, CERTIFIED REGISTERED

## 2023-10-20 PROCEDURE — 25010000002 BUPRENORPHINE PER 0.1 MG: Performed by: ANESTHESIOLOGY

## 2023-10-20 PROCEDURE — 25010000002 BUPIVACAINE (PF) 0.25 % SOLUTION: Performed by: ANESTHESIOLOGY

## 2023-10-20 PROCEDURE — 25010000002 MIDAZOLAM PER 1 MG: Performed by: NURSE ANESTHETIST, CERTIFIED REGISTERED

## 2023-10-20 PROCEDURE — 0FT44ZZ RESECTION OF GALLBLADDER, PERCUTANEOUS ENDOSCOPIC APPROACH: ICD-10-PCS | Performed by: SURGERY

## 2023-10-20 PROCEDURE — 47562 LAPAROSCOPIC CHOLECYSTECTOMY: CPT | Performed by: SURGERY

## 2023-10-20 PROCEDURE — 25010000002 DEXAMETHASONE PER 1 MG: Performed by: NURSE ANESTHETIST, CERTIFIED REGISTERED

## 2023-10-20 PROCEDURE — 25010000002 PIPERACILLIN SOD-TAZOBACTAM PER 1 G: Performed by: SURGERY

## 2023-10-20 DEVICE — CLIP APPLIER
Type: IMPLANTABLE DEVICE | Site: ABDOMEN | Status: FUNCTIONAL
Brand: ENDO CLIP

## 2023-10-20 RX ORDER — FENTANYL CITRATE 50 UG/ML
INJECTION, SOLUTION INTRAMUSCULAR; INTRAVENOUS AS NEEDED
Status: DISCONTINUED | OUTPATIENT
Start: 2023-10-20 | End: 2023-10-20 | Stop reason: SURG

## 2023-10-20 RX ORDER — OXYCODONE HYDROCHLORIDE AND ACETAMINOPHEN 5; 325 MG/1; MG/1
1 TABLET ORAL ONCE AS NEEDED
Status: DISCONTINUED | OUTPATIENT
Start: 2023-10-20 | End: 2023-10-20 | Stop reason: HOSPADM

## 2023-10-20 RX ORDER — IPRATROPIUM BROMIDE AND ALBUTEROL SULFATE 2.5; .5 MG/3ML; MG/3ML
3 SOLUTION RESPIRATORY (INHALATION) ONCE AS NEEDED
Status: DISCONTINUED | OUTPATIENT
Start: 2023-10-20 | End: 2023-10-20 | Stop reason: HOSPADM

## 2023-10-20 RX ORDER — POLYETHYLENE GLYCOL 3350 17 G/17G
17 POWDER, FOR SOLUTION ORAL 2 TIMES DAILY
Status: DISCONTINUED | OUTPATIENT
Start: 2023-10-20 | End: 2023-10-20 | Stop reason: HOSPADM

## 2023-10-20 RX ORDER — CITALOPRAM 20 MG/1
10 TABLET ORAL DAILY
Status: DISCONTINUED | OUTPATIENT
Start: 2023-10-20 | End: 2023-10-20 | Stop reason: HOSPADM

## 2023-10-20 RX ORDER — OXYCODONE HYDROCHLORIDE AND ACETAMINOPHEN 5; 325 MG/1; MG/1
1 TABLET ORAL EVERY 4 HOURS PRN
Status: DISCONTINUED | OUTPATIENT
Start: 2023-10-20 | End: 2023-10-20 | Stop reason: HOSPADM

## 2023-10-20 RX ORDER — DEXAMETHASONE SODIUM PHOSPHATE 4 MG/ML
INJECTION, SOLUTION INTRA-ARTICULAR; INTRALESIONAL; INTRAMUSCULAR; INTRAVENOUS; SOFT TISSUE AS NEEDED
Status: DISCONTINUED | OUTPATIENT
Start: 2023-10-20 | End: 2023-10-20 | Stop reason: SURG

## 2023-10-20 RX ORDER — SODIUM CHLORIDE 9 MG/ML
40 INJECTION, SOLUTION INTRAVENOUS AS NEEDED
Status: DISCONTINUED | OUTPATIENT
Start: 2023-10-20 | End: 2023-10-20 | Stop reason: HOSPADM

## 2023-10-20 RX ORDER — BUPIVACAINE HYDROCHLORIDE 2.5 MG/ML
INJECTION, SOLUTION EPIDURAL; INFILTRATION; INTRACAUDAL
Status: COMPLETED | OUTPATIENT
Start: 2023-10-20 | End: 2023-10-20

## 2023-10-20 RX ORDER — PROPOFOL 10 MG/ML
VIAL (ML) INTRAVENOUS AS NEEDED
Status: DISCONTINUED | OUTPATIENT
Start: 2023-10-20 | End: 2023-10-20 | Stop reason: SURG

## 2023-10-20 RX ORDER — PROMETHAZINE HYDROCHLORIDE 25 MG/1
25 SUPPOSITORY RECTAL EVERY 6 HOURS PRN
Status: DISCONTINUED | OUTPATIENT
Start: 2023-10-20 | End: 2023-10-20 | Stop reason: HOSPADM

## 2023-10-20 RX ORDER — ONDANSETRON 2 MG/ML
4 INJECTION INTRAMUSCULAR; INTRAVENOUS AS NEEDED
Status: DISCONTINUED | OUTPATIENT
Start: 2023-10-20 | End: 2023-10-20 | Stop reason: HOSPADM

## 2023-10-20 RX ORDER — SODIUM CHLORIDE, SODIUM LACTATE, POTASSIUM CHLORIDE, CALCIUM CHLORIDE 600; 310; 30; 20 MG/100ML; MG/100ML; MG/100ML; MG/100ML
100 INJECTION, SOLUTION INTRAVENOUS ONCE AS NEEDED
Status: DISCONTINUED | OUTPATIENT
Start: 2023-10-20 | End: 2023-10-20 | Stop reason: HOSPADM

## 2023-10-20 RX ORDER — ROCURONIUM BROMIDE 10 MG/ML
INJECTION, SOLUTION INTRAVENOUS AS NEEDED
Status: DISCONTINUED | OUTPATIENT
Start: 2023-10-20 | End: 2023-10-20 | Stop reason: SURG

## 2023-10-20 RX ORDER — DEXAMETHASONE SODIUM PHOSPHATE 4 MG/ML
INJECTION, SOLUTION INTRA-ARTICULAR; INTRALESIONAL; INTRAMUSCULAR; INTRAVENOUS; SOFT TISSUE
Status: COMPLETED | OUTPATIENT
Start: 2023-10-20 | End: 2023-10-20

## 2023-10-20 RX ORDER — DEXMEDETOMIDINE HYDROCHLORIDE 4 UG/ML
INJECTION, SOLUTION INTRAVENOUS AS NEEDED
Status: DISCONTINUED | OUTPATIENT
Start: 2023-10-20 | End: 2023-10-20 | Stop reason: SURG

## 2023-10-20 RX ORDER — SODIUM CHLORIDE, SODIUM LACTATE, POTASSIUM CHLORIDE, CALCIUM CHLORIDE 600; 310; 30; 20 MG/100ML; MG/100ML; MG/100ML; MG/100ML
125 INJECTION, SOLUTION INTRAVENOUS ONCE
Status: DISCONTINUED | OUTPATIENT
Start: 2023-10-20 | End: 2023-10-20 | Stop reason: HOSPADM

## 2023-10-20 RX ORDER — GLYCOPYRROLATE 0.2 MG/ML
INJECTION INTRAMUSCULAR; INTRAVENOUS AS NEEDED
Status: DISCONTINUED | OUTPATIENT
Start: 2023-10-20 | End: 2023-10-20 | Stop reason: SURG

## 2023-10-20 RX ORDER — MIDAZOLAM HYDROCHLORIDE 1 MG/ML
INJECTION INTRAMUSCULAR; INTRAVENOUS AS NEEDED
Status: DISCONTINUED | OUTPATIENT
Start: 2023-10-20 | End: 2023-10-20 | Stop reason: SURG

## 2023-10-20 RX ORDER — SODIUM CHLORIDE 0.9 % (FLUSH) 0.9 %
10 SYRINGE (ML) INJECTION EVERY 12 HOURS SCHEDULED
Status: DISCONTINUED | OUTPATIENT
Start: 2023-10-20 | End: 2023-10-20 | Stop reason: HOSPADM

## 2023-10-20 RX ORDER — BUPRENORPHINE HYDROCHLORIDE 0.32 MG/ML
INJECTION INTRAMUSCULAR; INTRAVENOUS
Status: COMPLETED | OUTPATIENT
Start: 2023-10-20 | End: 2023-10-20

## 2023-10-20 RX ORDER — MIDAZOLAM HYDROCHLORIDE 1 MG/ML
1 INJECTION INTRAMUSCULAR; INTRAVENOUS
Status: DISCONTINUED | OUTPATIENT
Start: 2023-10-20 | End: 2023-10-20 | Stop reason: HOSPADM

## 2023-10-20 RX ORDER — MEPERIDINE HYDROCHLORIDE 25 MG/ML
12.5 INJECTION INTRAMUSCULAR; INTRAVENOUS; SUBCUTANEOUS
Status: DISCONTINUED | OUTPATIENT
Start: 2023-10-20 | End: 2023-10-20 | Stop reason: HOSPADM

## 2023-10-20 RX ORDER — SODIUM CHLORIDE 0.9 % (FLUSH) 0.9 %
10 SYRINGE (ML) INJECTION AS NEEDED
Status: DISCONTINUED | OUTPATIENT
Start: 2023-10-20 | End: 2023-10-20 | Stop reason: HOSPADM

## 2023-10-20 RX ORDER — OXYCODONE HYDROCHLORIDE AND ACETAMINOPHEN 5; 325 MG/1; MG/1
1 TABLET ORAL EVERY 4 HOURS PRN
Status: DISCONTINUED | OUTPATIENT
Start: 2023-10-20 | End: 2023-10-20 | Stop reason: SDUPTHER

## 2023-10-20 RX ORDER — HYDROCODONE BITARTRATE AND ACETAMINOPHEN 7.5; 325 MG/1; MG/1
1 TABLET ORAL 4 TIMES DAILY PRN
Qty: 12 TABLET | Refills: 0 | OUTPATIENT
Start: 2023-10-20

## 2023-10-20 RX ORDER — MAGNESIUM HYDROXIDE 1200 MG/15ML
LIQUID ORAL AS NEEDED
Status: DISCONTINUED | OUTPATIENT
Start: 2023-10-20 | End: 2023-10-20 | Stop reason: HOSPADM

## 2023-10-20 RX ORDER — SODIUM CHLORIDE, SODIUM LACTATE, POTASSIUM CHLORIDE, CALCIUM CHLORIDE 600; 310; 30; 20 MG/100ML; MG/100ML; MG/100ML; MG/100ML
INJECTION, SOLUTION INTRAVENOUS CONTINUOUS PRN
Status: DISCONTINUED | OUTPATIENT
Start: 2023-10-20 | End: 2023-10-20 | Stop reason: SURG

## 2023-10-20 RX ORDER — NEOSTIGMINE METHYLSULFATE 1 MG/ML
INJECTION, SOLUTION INTRAVENOUS AS NEEDED
Status: DISCONTINUED | OUTPATIENT
Start: 2023-10-20 | End: 2023-10-20 | Stop reason: SURG

## 2023-10-20 RX ORDER — DOCUSATE SODIUM 100 MG/1
100 CAPSULE, LIQUID FILLED ORAL 2 TIMES DAILY
Status: DISCONTINUED | OUTPATIENT
Start: 2023-10-20 | End: 2023-10-20 | Stop reason: HOSPADM

## 2023-10-20 RX ORDER — FENTANYL CITRATE 50 UG/ML
50 INJECTION, SOLUTION INTRAMUSCULAR; INTRAVENOUS
Status: DISCONTINUED | OUTPATIENT
Start: 2023-10-20 | End: 2023-10-20 | Stop reason: HOSPADM

## 2023-10-20 RX ADMIN — SODIUM CHLORIDE, POTASSIUM CHLORIDE, SODIUM LACTATE AND CALCIUM CHLORIDE: 600; 310; 30; 20 INJECTION, SOLUTION INTRAVENOUS at 13:36

## 2023-10-20 RX ADMIN — DEXMEDETOMIDINE HYDROCHLORIDE 8 MCG: 4 INJECTION, SOLUTION INTRAVENOUS at 14:01

## 2023-10-20 RX ADMIN — PROPOFOL 200 MG: 10 INJECTION, EMULSION INTRAVENOUS at 13:38

## 2023-10-20 RX ADMIN — NEOSTIGMINE METHYLSULFATE 3 MG: 0.5 INJECTION INTRAVENOUS at 14:29

## 2023-10-20 RX ADMIN — BUPRENORPHINE HYDROCHLORIDE 0.3 MG: 0.32 INJECTION INTRAMUSCULAR; INTRAVENOUS at 13:45

## 2023-10-20 RX ADMIN — PIPERACILLIN SODIUM AND TAZOBACTAM SODIUM 3.38 G: 3; .375 INJECTION, POWDER, LYOPHILIZED, FOR SOLUTION INTRAVENOUS at 11:21

## 2023-10-20 RX ADMIN — BUPIVACAINE HYDROCHLORIDE 60 ML: 2.5 INJECTION, SOLUTION EPIDURAL; INFILTRATION; INTRACAUDAL; PERINEURAL at 13:45

## 2023-10-20 RX ADMIN — OXYCODONE AND ACETAMINOPHEN 1 TABLET: 5; 325 TABLET ORAL at 06:41

## 2023-10-20 RX ADMIN — MIDAZOLAM HYDROCHLORIDE 1 MG: 1 INJECTION, SOLUTION INTRAMUSCULAR; INTRAVENOUS at 14:06

## 2023-10-20 RX ADMIN — MIDAZOLAM HYDROCHLORIDE 1 MG: 1 INJECTION, SOLUTION INTRAMUSCULAR; INTRAVENOUS at 13:35

## 2023-10-20 RX ADMIN — DEXAMETHASONE SODIUM PHOSPHATE 8 MG: 4 INJECTION, SOLUTION INTRA-ARTICULAR; INTRALESIONAL; INTRAMUSCULAR; INTRAVENOUS; SOFT TISSUE at 13:38

## 2023-10-20 RX ADMIN — ROCURONIUM BROMIDE 50 MG: 10 SOLUTION INTRAVENOUS at 13:38

## 2023-10-20 RX ADMIN — OXYCODONE AND ACETAMINOPHEN 1 TABLET: 5; 325 TABLET ORAL at 03:07

## 2023-10-20 RX ADMIN — Medication 3 ML: at 08:21

## 2023-10-20 RX ADMIN — FENTANYL CITRATE 50 MCG: 50 INJECTION, SOLUTION INTRAMUSCULAR; INTRAVENOUS at 14:04

## 2023-10-20 RX ADMIN — DEXAMETHASONE SODIUM PHOSPHATE 4 MG: 4 INJECTION, SOLUTION INTRA-ARTICULAR; INTRALESIONAL; INTRAMUSCULAR; INTRAVENOUS; SOFT TISSUE at 13:45

## 2023-10-20 RX ADMIN — ONDANSETRON 4 MG: 2 INJECTION INTRAMUSCULAR; INTRAVENOUS at 13:38

## 2023-10-20 RX ADMIN — SODIUM CHLORIDE, POTASSIUM CHLORIDE, SODIUM LACTATE AND CALCIUM CHLORIDE 100 ML/HR: 600; 310; 30; 20 INJECTION, SOLUTION INTRAVENOUS at 12:12

## 2023-10-20 RX ADMIN — FENTANYL CITRATE 50 MCG: 50 INJECTION, SOLUTION INTRAMUSCULAR; INTRAVENOUS at 13:35

## 2023-10-20 RX ADMIN — ONDANSETRON 4 MG: 2 INJECTION INTRAMUSCULAR; INTRAVENOUS at 11:31

## 2023-10-20 RX ADMIN — DEXMEDETOMIDINE HYDROCHLORIDE 8 MCG: 4 INJECTION, SOLUTION INTRAVENOUS at 14:36

## 2023-10-20 RX ADMIN — DEXMEDETOMIDINE HYDROCHLORIDE 8 MCG: 4 INJECTION, SOLUTION INTRAVENOUS at 14:09

## 2023-10-20 RX ADMIN — GLYCOPYRROLATE 0.6 MG: 0.4 INJECTION INTRAMUSCULAR; INTRAVENOUS at 14:29

## 2023-10-20 NOTE — ANESTHESIA POSTPROCEDURE EVALUATION
Patient: Ashley Araujo    Procedure Summary       Date: 10/20/23 Room / Location: [facility] OR 03 /  [facility] OR    Anesthesia Start: 1336 Anesthesia Stop: 1441    Procedure: CHOLECYSTECTOMY LAPAROSCOPIC POSSIBLE OPEN CHOLECYSTECTOMY (Abdomen) Diagnosis:       Calculus of gallbladder with acute cholecystitis without obstruction      (Calculus of gallbladder with acute cholecystitis without obstruction [K80.00])    Surgeons: Tiana Queen MD Provider: Dimitry Castellanos DO    Anesthesia Type: general, general with block ASA Status: 3            Anesthesia Type: general, general with block    Vitals  Vitals Value Taken Time   /68 10/20/23 1514   Temp 98.1 °F (36.7 °C) 10/20/23 1512   Pulse 65 10/20/23 1516   Resp 22 10/20/23 1512   SpO2 97 % 10/20/23 1516   Vitals shown include unfiled device data.        Post Anesthesia Care and Evaluation    Patient location during evaluation: PACU  Patient participation: complete - patient participated  Level of consciousness: awake and alert  Pain score: 1  Pain management: adequate    Airway patency: patent  Anesthetic complications: No anesthetic complications  PONV Status: controlled  Cardiovascular status: acceptable  Respiratory status: acceptable and room air  Hydration status: euvolemic  No anesthesia care post op

## 2023-10-20 NOTE — ANESTHESIA PROCEDURE NOTES
Airway  Urgency: elective    Date/Time: 10/20/2023 1:41 PM  Airway not difficult    General Information and Staff    Patient location during procedure: OR  Anesthesiologist: Dimitry Castellanos DO  CRNA/CAA: Kristin Squires CRNA    Indications and Patient Condition  Indications for airway management: airway protection    Preoxygenated: yes  MILS maintained throughout  Mask difficulty assessment: 1 - vent by mask    Final Airway Details  Final airway type: endotracheal airway      Successful airway: ETT  Cuffed: yes   Successful intubation technique: direct laryngoscopy  Endotracheal tube insertion site: oral  Blade: Lashanda  Blade size: 3  ETT size (mm): 7.0  Cormack-Lehane Classification: grade I - full view of glottis  Placement verified by: chest auscultation and capnometry   Measured from: teeth  ETT/EBT  to teeth (cm): 21  Number of attempts at approach: 1  Assessment: lips, teeth, and gum same as pre-op and atraumatic intubation    Additional Comments  AOI x 1 PASS, +ETCO2, +R/F/C. MOUTH TEETH GUMS SAME AS PREOP; INTUBATED BY DR. CABRERA

## 2023-10-20 NOTE — PAYOR COMM NOTE
"CONTACT: ANDREW LEWIS RN  UTILIZATION MANAGEMENT DEPT.  [facility]  [address] [place] [address]  PHONE: [phone]  FAX: [phone]      INPATIENT AUTH REQUEST    Salina Araujo (28 y.o. Female)       Date of Birth   1995    Social Security Number       Address   [facility] [address]    Home Phone   [phone]    N   [id number]       Jewish   Unknown    Marital Status   Unknown                            Admission Date   10/19/23    Admission Type   Emergency    Admitting Provider   Tiana Queen MD    Attending Provider   Tiana Queen MD    Department, Room/Bed   [address], [facility]0/       Discharge Date       Discharge Disposition   Home or Self Care    Discharge Destination                                 Attending Provider: Tiana Queen MD    Allergies: No Known Allergies    Isolation: None   Infection: None   Code Status: CPR    Ht: 177.8 cm (70\")   Wt: 101 kg (221 lb 12.5 oz)    Admission Cmt: None   Principal Problem: Cholelithiasis [K80.20]                   Active Insurance as of 10/19/2023       Primary Coverage       Payor Plan Insurance Group Employer/Plan Group    [employer] [place] [employer] [place]ICA[place]        Payor Plan Address Payor Plan Phone Number Payor Plan Fax Number Effective Dates    [address] [address] [phone]  2/1/2023 - None Entered    [facility] [id number]         Subscriber Name Subscriber Birth Date Member ID       SALINA ARAUJO 1995 [id number]                     Emergency Contacts        (Rel.) Home Phone Work Phone Mobile Phone    Neo Fernandez (Significant Other) -- -- [phone]                 History & Physical        Tiana Queen MD at 10/20/23 0916                Chief complaint cholelithiasis, cholecystitis    Subjective    Patient is a 28 y.o. female who presented to the emergency room with severe right upper quadrant pain, nausea and vomiting.  Work-up " demonstrated findings compatible with cholecystitis with a positive Henriquez sign.  Patient denies any prior attacks before this current 1      Review of Systems  Review of Systems - General ROS: negative for - weight loss  Psychological ROS: negative for - behavioral disorder  Ophthalmic ROS: negative for - dry eyes  ENT ROS: negative for - vertigo or vocal changes  Hematological and Lymphatic ROS: negative for - jaundice or swollen lymph nodes  Respiratory ROS: negative for - sputum changes or stridor  Cardiovascular ROS: negative for - irregular heartbeat or murmur  Gastrointestinal ROS: negative for - blood in stools or change in stools  Genitourinary ROS: negative for - hematuria or incontinence  Musculoskeletal ROS: negative for - gait disturbance      History  Past Medical History:   Diagnosis Date    Anxiety     Asthma     Depression     Klippel-Feil syndrome      Past Surgical History:   Procedure Laterality Date     SECTION N/A 2023    Procedure:  SECTION PRIMARY;  Surgeon: Kassi Martinez MD;  Location: [facility] LABOR DELIVERY;  Service: Obstetrics;  Laterality: N/A;   Appendectomy  History reviewed. No pertinent family history.  Social History     Tobacco Use    Smoking status: Never     Passive exposure: Never    Smokeless tobacco: Never   Vaping Use    Vaping Use: Never used   Substance Use Topics    Alcohol use: Never    Drug use: Never     Medications Prior to Admission   Medication Sig Dispense Refill Last Dose    citalopram (CeleXA) 10 MG tablet Take 1 tablet by mouth Daily.       docusate sodium 100 MG capsule Take 1 capsule by mouth 2 (Two) Times a Day As Needed for Constipation. 40 capsule 1     folic acid (FOLVITE) 1 MG tablet Take 1 tablet by mouth Daily.       prenatal vitamin (prenatal, CLASSIC, vitamin) tablet Take 1 tablet by mouth Daily.       simethicone (MYLICON) 80 MG chewable tablet Chew and swallow 1 tablet 4 (Four) Times a Day. 40 tablet 1      Allergies:   "Patient has no known allergies.    Objective    Vital Signs  Temp:  [98.1 °F (36.7 °C)] 98.1 °F (36.7 °C)  Heart Rate:  [62] 62  Resp:  [17] 17  BP: (140)/(85) 140/85    Physical Exam  General:  This is a WD WN female in no acute distress  Vital signs: Stable, afebrile  HEENT exam:  WNL. Sclerae are anicteric.  EOMI. poor dentition  Neck:  Supple, FROM.  No JVD.  Trachea midline  Lungs:  Respiratory effort normal. Auscultation: Clear, without wheezes, rhonchi, rales  Heart:  Regular rate and rhythm, without murmur, gallop, rub.  No pedal edema  Abdomen: Bowel sounds are present.  Patient reports tenderness to palpation in the right upper quadrant but there is no Henriquez sign.  Musculoskeletal:  Muscle strength/tone is normal.    Psychiatric:  Alert, oriented x 3.  Mood and affect are appropriate  Skin:  Warm with good turgor.  Without rash or lesion  Extremities:  Examination of the extremities revealed no cyanosis, clubbing or edema.    Results Review:       Results from last 7 days   Lab Units 10/19/23  1844   WBC 10*3/mm3 9.85   HEMOGLOBIN g/dL 15.7   HEMATOCRIT % 46.7*   PLATELETS 10*3/mm3 288         Results from last 7 days   Lab Units 10/19/23  1844   SODIUM mmol/L 141   POTASSIUM mmol/L 4.5   CHLORIDE mmol/L 106   CO2 mmol/L 23.9   BUN mg/dL 13   CREATININE mg/dL 0.79   CALCIUM mg/dL 9.6   GLUCOSE mg/dL 95   ALBUMIN g/dL 5.2   BILIRUBIN mg/dL 0.2   ALK PHOS U/L 68   AST (SGOT) U/L 25   ALT (SGPT) U/L 33   Estimated Creatinine Clearance: 138.4 mL/min (by C-G formula based on SCr of 0.79 mg/dL).  No results found for: \"AMMONIA\"      No results found for: \"BLOODCX\"  No results found for: \"URINECX\"  No results found for: \"WOUNDCX\"  No results found for: \"STOOLCX\"    Imaging:  Imaging Results (Last 24 Hours)       Procedure Component Value Units Date/Time    US Gallbladder [[id number]] Collected: 10/19/23 1933     Updated: 10/19/23 1937    Narrative:            Procedure: Ultrasound gallbladder with grayscale and " color Doppler  imaging.     Findings: Gallbladder is distended.  A gallbladder stone is noted within  the gallbladder neck.  A sonographic Henriquez sign was elicited during  this examination.  No pericholecystic fluid or gallbladder wall  thickening is identified.     Gallbladder wall measures 2.8 mm.  Common bile duct measures 2.7 mm,  within normal limits.  Hepatopetal flow noted.       Impression:         Gallbladder distention with cholelithiasis noted within the gallbladder  neck and a sonographic Henriquez sign, suggestive of acute cholecystitis.   Surgical consultation should be considered if appropriate.        This report was finalized on 10/19/2023 7:35 PM by Clau Farah MD.               Ultrasound reports and images were reviewed.  I agree with the assessment      Impression:  Patient Active Problem List   Diagnosis Code    Pregnancy Z34.90    Postpartum care following  delivery Z39.2    Cholelithiasis K80.20       Plan:  Symptomatic cholelithiasis      Discussion:  Proceed with laparoscopic cholecystectomy    Tiana Queen MD  10/19/23  20:22 EDT      Please note that portions of this note were completed with a voice recognition program.        Electronically signed by Tiana Queen MD at 10/20/23 0916       Emergency Department Notes    No notes of this type exist for this encounter.       Facility-Administered Medications as of 10/20/2023   Medication Dose Route Frequency Provider Last Rate Last Admin    citalopram (CeleXA) tablet 10 mg  10 mg Oral Daily Tiana Queen MD        docusate sodium (COLACE) capsule 100 mg  100 mg Oral BID Tiana Queen MD        [COMPLETED] HYDROmorphone (DILAUDID) injection 1 mg  1 mg Intravenous Once Carlos Harper MD   1 mg at 10/19/23 2005    lactated ringers infusion  100 mL/hr Intravenous Continuous Tiana Queen MD   Stopped at 10/20/23 1337    morphine injection 2 mg  2 mg Intravenous Q3H PRN Tiana Queen MD   2 mg at  10/19/23 2238    [COMPLETED] ondansetron (ZOFRAN) injection 4 mg  4 mg Intravenous Once Carlos Harper MD   4 mg at 10/19/23 1954    ondansetron (ZOFRAN) injection 4 mg  4 mg Intravenous Q4H PRN Tiana Queen MD   4 mg at 10/20/23 1338    oxyCODONE-acetaminophen (PERCOCET) 5-325 MG per tablet 1 tablet  1 tablet Oral Q4H PRN Tiana Queen MD        [COMPLETED] piperacillin-tazobactam (ZOSYN) IVPB 3.375 g in 100 mL NS VTB  3.375 g Intravenous Once Tiana Queen MD   3.375 g at 10/19/23 2243    piperacillin-tazobactam (ZOSYN) IVPB 3.375 g in 100 mL NS VTB  3.375 g Intravenous Q8H Tiana Queen MD   3.375 g at 10/20/23 1121    polyethylene glycol (MIRALAX) packet 17 g  17 g Oral BID Tiana Queen MD        promethazine (PHENERGAN) suppository 25 mg  25 mg Rectal Q6H PRN Tiana Queen MD        sodium chloride 0.9 % flush 3 mL  3 mL Intravenous Q12H Tiana Queen MD   3 mL at 10/20/23 0821    sodium chloride 0.9 % flush 3-10 mL  3-10 mL Intravenous PRN Tiana Queen MD        sodium chloride 0.9 % infusion 40 mL  40 mL Intravenous PRN Tiana Queen MD         Orders (all)        Start     Ordered    10/21/23 0600  ceFAZolin 2000 mg IVPB in 100 mL NS (VTB)  On Call to O.R.,   Status:  Discontinued         10/20/23 1454    10/20/23 2100  docusate sodium (COLACE) capsule 100 mg  2 Times Daily         10/20/23 1529    10/20/23 2100  polyethylene glycol (MIRALAX) packet 17 g  2 Times Daily         10/20/23 1529    10/20/23 1530  Diet: Regular/House Diet; Texture: Regular Texture (IDDSI 7); Fluid Consistency: Thin (IDDSI 0)  Diet Effective Now         10/20/23 1529    10/20/23 1530  heplock IV when taking po well  Nursing Communication  Until Discontinued        Comments: heplock IV when taking po well    10/20/23 1529    10/20/23 1529  oxyCODONE-acetaminophen (PERCOCET) 5-325 MG per tablet 1 tablet  Every 4 Hours PRN         10/20/23 1529    10/20/23 1455  Oxygen Therapy-  Nasal Cannula; Titrate 1-6 LPM Per SpO2; 90 - 95%  Continuous,   Status:  Canceled         10/20/23 1454    10/20/23 1455  Pulse Oximetry, Continuous  Continuous,   Status:  Canceled         10/20/23 1454    10/20/23 1455  POC Glucose STAT  STAT,   Status:  Canceled        Comments: Notify Anesthesia if blood sugar is less than 80 mg/dL or greater than 180mg/dL      10/20/23 1454    10/20/23 1455  Vital signs every 5 minutes for 15 minutes, every 15 minutes thereafter.  Once,   Status:  Canceled         10/20/23 1454    10/20/23 1455  Call Anesthesiologist for additional IV Fluid bolus for Hypotension/Tachycardia  Until Discontinued,   Status:  Canceled         10/20/23 1454    10/20/23 1455  Notify Anesthesia of Any Acute Changes in Patient Condition  Until Discontinued,   Status:  Canceled         10/20/23 1454    10/20/23 1455  Notify Anesthesia for Unrelieved Pain  Until Discontinued,   Status:  Canceled         10/20/23 1454    10/20/23 1455  Once DC criteria to floor met, follow surgeon's orders.  Until Discontinued,   Status:  Canceled         10/20/23 1454    10/20/23 1455  Discharge patient from PACU when discharge criteria is met.  Until Discontinued,   Status:  Canceled         10/20/23 1454    10/20/23 1454  ondansetron (ZOFRAN) injection 4 mg  As Needed,   Status:  Discontinued         10/20/23 1454    10/20/23 1454  meperidine (DEMEROL) injection 12.5 mg  Every 5 Minutes PRN,   Status:  Discontinued         10/20/23 1454    10/20/23 1454  ipratropium-albuterol (DUO-NEB) nebulizer solution 3 mL  Once As Needed,   Status:  Discontinued         10/20/23 1454    10/20/23 1454  lactated ringers infusion  Once As Needed,   Status:  Discontinued         10/20/23 1454    10/20/23 1454  oxyCODONE-acetaminophen (PERCOCET) 5-325 MG per tablet 1 tablet  Once As Needed,   Status:  Discontinued         10/20/23 1454    10/20/23 1454  fentaNYL citrate (PF) (SUBLIMAZE) injection 50 mcg  Every 5 Minutes PRN,   Status:   Discontinued         10/20/23 1454    10/20/23 1451  May discharge patient home tonight or in am if voiding and tolerating po well  Nursing Communication  Continuous        Comments: May discharge patient home tonight or in am if voiding and tolerating po well    10/20/23 1451    10/20/23 1448  Discharge to care of  when patient meets criteria  Once         10/20/23 1450    10/20/23 1448  Give patient post-op discharge instructions per surgeon  Once,   Status:  Canceled         10/20/23 1450    10/20/23 1448  Maintain IV access until discharge  Once,   Status:  Canceled         10/20/23 1450    10/20/23 1448  Do not place IVs or BP cuff on affected side of surgery  Once,   Status:  Canceled         10/20/23 1450    10/20/23 1448  Ambulate patient prior to discharge  Once,   Status:  Canceled         10/20/23 1450    10/20/23 1448  Patient must void prior to discharge  Once,   Status:  Canceled         10/20/23 1450    10/20/23 1448  Patient must tolerate clear liquid prior to discharge  Once,   Status:  Canceled         10/20/23 1450    10/20/23 1447  Discharge patient  Once         10/20/23 1450    10/20/23 1358  TISSUE EXAM, [facility] (MICHAEL,[facility])  RELEASE UPON ORDERING         10/20/23 1358    10/20/23 1357  sodium chloride (NS) irrigation solution  As Needed,   Status:  Discontinued         10/20/23 1357    10/20/23 1304  sodium chloride 0.9 % flush 10 mL  Every 12 Hours Scheduled,   Status:  Discontinued         10/20/23 1302    10/20/23 1304  lactated ringers infusion  Once,   Status:  Discontinued         10/20/23 1302    10/20/23 1303  Oxygen Therapy- Nasal Cannula; Titrate 1-6 LPM Per SpO2; 90 - 95%  Continuous,   Status:  Canceled         10/20/23 1302    10/20/23 1303  POC Glucose Once  Once,   Status:  Canceled        Comments: For all diabetic patients and all patients who are to be admitted. Notify Anesthesiologist for blood sugar > 180.      10/20/23 1302    10/20/23 1303  POC Urine Pregnancy   STAT,   Status:  Canceled         10/20/23 1302    10/20/23 1303  Insert Peripheral IV  Once,   Status:  Canceled         10/20/23 1302    10/20/23 1303  Saline Lock & Maintain IV Access  Continuous,   Status:  Canceled         10/20/23 1302    10/20/23 1303  May take Beta Blocker from home with sip of water.  Once,   Status:  Canceled         10/20/23 1302    10/20/23 1302  Vital Signs - Per Anesthesia Protocol  As Needed,   Status:  Canceled       10/20/23 1302    10/20/23 1302  sodium chloride 0.9 % flush 10 mL  As Needed,   Status:  Discontinued         10/20/23 1302    10/20/23 1302  sodium chloride 0.9 % infusion 40 mL  As Needed,   Status:  Discontinued         10/20/23 1302    10/20/23 1302  midazolam (VERSED) injection 1 mg  Every 10 Minutes PRN,   Status:  Discontinued         10/20/23 1302    10/20/23 1301  FL rudi endo (surgery)  1 Time Imaging         10/20/23 1300    10/20/23 0900  citalopram (CeleXA) tablet 10 mg  Daily         10/20/23 0718    10/20/23 0800  Oral Care  2 Times Daily       10/19/23 2021    10/20/23 0400  piperacillin-tazobactam (ZOSYN) IVPB 3.375 g in 100 mL NS VTB  Every 8 Hours         10/19/23 2021    10/20/23 0106  oxyCODONE-acetaminophen (PERCOCET) 5-325 MG per tablet 1 tablet  Every 4 Hours PRN,   Status:  Discontinued         10/20/23 0107    10/20/23 0104  promethazine (PHENERGAN) suppository 25 mg  Every 6 Hours PRN         10/20/23 0107    10/20/23 0001  NPO Diet NPO Type: Strict NPO, Sips with Meds  Diet Effective Midnight,   Status:  Canceled         10/19/23 2014    10/20/23 0000  Vital Signs  Every 4 Hours      Comments: Per per hospital policy    10/19/23 2021    10/20/23 0000  Remove dressing in 48 hours         10/20/23 1450    10/20/23 0000  Discharge Follow-up with Specified Provider: Dr. Queen; 2 Weeks         10/20/23 1450    10/20/23 0000  Discharge activity        Comments: 1) Return to school / work - to be determined at office fu  2) May shower in 48  hours.  3) Do not lift / push / pull more then 15 lbs.    10/20/23 1450    10/20/23 0000  Nursing Communication        Comments: May discharge home or in am when voiding and tolerating po well    10/20/23 1450    10/20/23 0000  Nursing Communication         10/20/23 1450    10/19/23 2200  Incentive Spirometry  Every 4 Hours While Awake       10/19/23 2021    10/19/23 2100  sodium chloride 0.9 % flush 3 mL  Every 12 Hours Scheduled         10/19/23 2021    10/19/23 2100  piperacillin-tazobactam (ZOSYN) IVPB 3.375 g in 100 mL NS VTB  Once         10/19/23 2021    10/19/23 2037  lactated ringers infusion  Continuous         10/19/23 2021    10/19/23 2021  Bowel Regimen Not Indicated  Once         10/19/23 2021    10/19/23 2021  Continuous Pulse Oximetry  Continuous,   Status:  Canceled         10/19/23 2021    10/19/23 2020  morphine injection 2 mg  Every 3 Hours PRN         10/19/23 2021    10/19/23 2020  ondansetron (ZOFRAN) injection 4 mg  Every 4 Hours PRN         10/19/23 2021    10/19/23 2020  Notify Physician (with Parameters)  Until Discontinued         10/19/23 2021    10/19/23 2020  Intake & Output  Every Shift       10/19/23 2021    10/19/23 2020  Weigh patient  Once         10/19/23 2021    10/19/23 2020  Insert Peripheral IV  Once         10/19/23 2021    10/19/23 2020  Saline Lock & Maintain IV Access  Continuous,   Status:  Canceled         10/19/23 2021    10/19/23 2020  Code Status and Medical Interventions:  Continuous         10/19/23 2021    10/19/23 2020  Place Sequential Compression Device  Once         10/19/23 2021    10/19/23 2020  Maintain Sequential Compression Device  Continuous         10/19/23 2021    10/19/23 2020  Activity - Ad Caitlyn  Until Discontinued         10/19/23 2021    10/19/23 2019  sodium chloride 0.9 % flush 3-10 mL  As Needed         10/19/23 2021    10/19/23 2019  sodium chloride 0.9 % infusion 40 mL  As Needed         10/19/23 2021    10/19/23 2013  Case Request  Once          10/19/23 2014    10/19/23 2013  Verify Informed Consent  Once         10/19/23 2014    10/19/23 2008  ondansetron (ZOFRAN) injection 4 mg  Once         10/19/23 1952    10/19/23 2008  HYDROmorphone (DILAUDID) injection 1 mg  Once         10/19/23 1952    10/19/23 2007  ondansetron (ZOFRAN) injection 4 mg  Once,   Status:  Discontinued         10/19/23 1951    10/19/23 1956  COVID-19 and FLU A/B PCR - Swab, Nasopharynx  Once         10/19/23 1955    10/19/23 1955  Surgery (on-call MD unless specified)  Once        Specialty:  General Surgery  Provider:  Tiana Queen MD    10/19/23 1954    10/19/23 1955  Inpatient Admission  Once         10/19/23 1955    10/19/23 1953  Pregnancy, Urine - Urine, Clean Catch  STAT         10/19/23 1952    10/19/23 1952  Surgery (on-call MD unless specified)  Once        Specialty:  General Surgery  Provider:  Tiana Queen MD    10/19/23 1951    10/19/23 1851  US Gallbladder  1 Time Imaging         10/19/23 1850    10/19/23 1837  Urinalysis With Microscopic If Indicated (No Culture) - Urine, Clean Catch  Once         10/19/23 1836    10/19/23 1836  CBC & Differential  Once         10/19/23 1835    10/19/23 1836  Comprehensive Metabolic Panel  Once         10/19/23 1835    10/19/23 1836  CBC Auto Differential  PROCEDURE ONCE         10/19/23 1835    --  citalopram (CeleXA) 10 MG tablet  Daily         10/19/23 1955    --  folic acid (FOLVITE) 1 MG tablet  Daily,   Status:  Discontinued         10/19/23 1955    Pending  HYDROcodone-acetaminophen (Norco) 7.5-325 MG per tablet  4 Times Daily PRN         Pending                     Operative/Procedure Notes (all)        Tiana Queen MD at 10/20/23 1356  Version 1 of 1         Laparoscopic Cholecystectomy     Surgeon:  Tiana Queen M.D., F.A.C.S.    Assistant:  Kanchan    Indications: This patient presents with symptomatic gallbladder disease and will undergo laparoscopic cholecystectomy.    Pre-operative Diagnosis:  cholelithiasis/cholecyctitis    Post-operative Diagnosis: same    Anesthesia: General with block    Procedure Details   After obtaining informed consent and with venous compression boots in place, patient was taken to the operating room and placed in the supine position. After induction of general anesthesia, antibiotic prophylaxis was administered. General endotracheal anesthesia was then administered and  the abdomen was prepped and draped in the usual sterile fashion.     An incision was made above the umbilicus and the Veress needle was inserted. Pneumoperitoneum was obtained to 15mmHg and the trocars were placed.  The camera was inserted, confirming position within the abdomen.  The patient was placed in reverse Trendelenburg and additional trocars were introduced under direct vision.    The gallbladder was identified, the fundus grasped and retracted cephalad. Adhesions were lysed and with the electrocautery where indicated, taking care not to injure any adjacent organs or viscus. The infundibulum was grasped and retracted laterally, exposing the peritoneum overlying the triangle of Calot. This was then divided and exposed in a blunt fashion. The cystic duct and cystic artery were clearly identified and dissected circumferentially.     The cystic duct was clipped proximally and divided.  The cystic artery was identified, dissected free, ligated with clips and divided as well.     The gallbladder was dissected from the liver bed in retrograde fashion with the electrocautery. The gallbladder was removed. The liver bed was irrigated and inspected. Hemostasis was achieved with the electrocautery.     Camera was switched to the subxiphoid position, the gallbladder was placed within the Endo Catch and brought out through the umbilical port.  The umbilical fascia and subxiphoid fascia were closed.  The remaining trocars were removed and the skin was closed with a 4-0 Vicryl subcuticular stitch and a sterile dressing  was applied.    Instrument, sponge, and needle counts were correct at closure and at the conclusion of the case.     Patient tolerated the procedure well, was taken to the recovery room in stable condition    Findings:  Hydrops of gallbladder    Estimated Blood Loss: Minimal    Blood administered: None           Drains: None    Grafts and Implants: None           Total IV Fluids: Per anesthesia           Specimens: Gallbladder             Complications: None                Electronically signed by Tiana Queen MD at 10/20/23 1443       Physician Progress Notes (all)    No notes of this type exist for this encounter.       Consult Notes (all)    No notes of this type exist for this encounter.

## 2023-10-20 NOTE — PLAN OF CARE
Goal Outcome Evaluation:           Progress: no change  Outcome Evaluation: pt arrived back from surgery to room 330, pt alert and oriented but drowsy, will continue plan of care.

## 2023-10-20 NOTE — OP NOTE
Laparoscopic Cholecystectomy     Surgeon:  Tiana Queen M.D., LESLIE    Assistant:  Kanchan    Indications: This patient presents with symptomatic gallbladder disease and will undergo laparoscopic cholecystectomy.    Pre-operative Diagnosis: cholelithiasis/cholecyctitis    Post-operative Diagnosis: same    Anesthesia: General with block    Procedure Details   After obtaining informed consent and with venous compression boots in place, patient was taken to the operating room and placed in the supine position. After induction of general anesthesia, antibiotic prophylaxis was administered. General endotracheal anesthesia was then administered and  the abdomen was prepped and draped in the usual sterile fashion.     An incision was made above the umbilicus and the Veress needle was inserted. Pneumoperitoneum was obtained to 15mmHg and the trocars were placed.  The camera was inserted, confirming position within the abdomen.  The patient was placed in reverse Trendelenburg and additional trocars were introduced under direct vision.    The gallbladder was identified, the fundus grasped and retracted cephalad. Adhesions were lysed and with the electrocautery where indicated, taking care not to injure any adjacent organs or viscus. The infundibulum was grasped and retracted laterally, exposing the peritoneum overlying the triangle of Calot. This was then divided and exposed in a blunt fashion. The cystic duct and cystic artery were clearly identified and dissected circumferentially.     The cystic duct was clipped proximally and divided.  The cystic artery was identified, dissected free, ligated with clips and divided as well.     The gallbladder was dissected from the liver bed in retrograde fashion with the electrocautery. The gallbladder was removed. The liver bed was irrigated and inspected. Hemostasis was achieved with the electrocautery.     Camera was switched to the subxiphoid position, the gallbladder was placed  within the Endo Catch and brought out through the umbilical port.  The umbilical fascia and subxiphoid fascia were closed.  The remaining trocars were removed and the skin was closed with a 4-0 Vicryl subcuticular stitch and a sterile dressing was applied.    Instrument, sponge, and needle counts were correct at closure and at the conclusion of the case.     Patient tolerated the procedure well, was taken to the recovery room in stable condition    Findings:  Hydrops of gallbladder    Estimated Blood Loss: Minimal    Blood administered: None           Drains: None    Grafts and Implants: None           Total IV Fluids: Per anesthesia           Specimens: Gallbladder             Complications: None

## 2023-10-20 NOTE — ANESTHESIA PROCEDURE NOTES
"Peripheral Block      Patient reassessed immediately prior to procedure    Patient location during procedure: OR  Start time: 10/20/2023 1:45 PM  Stop time: 10/20/2023 1:48 PM  Reason for block: at surgeon's request and post-op pain management  Performed by  CRNA/CAA: Kristin Squires, JON  Assisted by: Fabián Hoff RN  Preanesthetic Checklist  Completed: patient identified, IV checked, site marked, risks and benefits discussed, surgical consent, monitors and equipment checked, pre-op evaluation and timeout performed  Prep:  Pt Position: supine  Sterile barriers:cap, gloves, sterile barriers and mask  Prep: ChloraPrep  Patient monitoring: blood pressure monitoring, continuous pulse oximetry and EKG  Procedure    Nursing cardiac assessment comments yes: Sedation, GA, Spinal,Epidural   Performed under: general  Guidance:ultrasound guided    ULTRASOUND INTERPRETATION.  Using ultrasound guidance a 20 G (20g 4\" Stimuplex) gauge needle was placed in close proximity to the nerve, at which point, under ultrasound guidance anesthetic was injected in the area of the nerve and spread of the anesthesia was seen on ultrasound in close proximity thereto.  There were no abnormalities seen on ultrasound; a digital image was taken; and the patient tolerated the procedure with no complications. Images:still images obtained    Laterality:Bilateral  Block Type:TAP  Injection Technique:single-shot  Needle Type:short-bevel  Needle Gauge:20 G  Resistance on Injection: none    Medications Used: buprenorphine (BUPRENEX) injection - Peripheral Nerve, Transversus Abdominus Plane   0.3 mg - 10/20/2023 1:45:00 PM  dexamethasone (DECADRON) injection - Peripheral Nerve, Transversus Abdominus Plane   4 mg - 10/20/2023 1:45:00 PM  bupivacaine (PF) (MARCAINE) 0.25 % injection - Peripheral Nerve, Transversus Abdominus Plane   60 mL - 10/20/2023 1:45:00 PM      Medications  Comment:Block Injection:  Total volume divided equally between all 4 " injection sites      Post Assessment  Injection Assessment: negative aspiration for heme, incremental injection and no paresthesia on injection  Patient Tolerance:comfortable throughout block  Complications:no  Additional Notes  The pt was in the supine position under general anesthesia    Under Ultrasound guidance, a BBraun 4inch 360 degree needle was advanced.  The Internal Oblique and Transversus Abdominus muscles where visualized.  At or before the aponeurosis of Internal Oblique, local anesthetic spread was visualized in the Transversus Abdominus Plane. Injection was made incrementally with aspiration every 5 mls.  There was no  intravascular injection,  injection pressure was normal, there was no neural injection, and the procedure was completed without difficulty. The same procedure was completed for left and right sided lateral tap blocks.    Under Ultrasound guidance, a Nava 4inch 360 degree needle was advanced.  The Rectus and Transversus Abdominus muscles where visualized.  The needle tip was placed between the Transversus Abdominus and rectus abdominus, local anesthetic spread was visualized in the Transversus Abdominus Plane. Injection was made incrementally with aspiration every 5 mls.  There was no  intravascular injection,  injection pressure was normal, there was no neural injection, and the procedure was completed without difficulty. The same procedure was completed for left and right sided subcostal tap blocks. Thank You.

## 2023-10-20 NOTE — PLAN OF CARE
Goal Outcome Evaluation:  Plan of Care Reviewed With: patient           Outcome Evaluation: Pt transferred to floor from ER. A&Ox4 on RA, VSS. Pt rested in bed this shift. Pt c/o N/V and pain, given PRN nausea and pain med's. Lost IV access, called Dr Queen after several failed attempts, ordered phenergan supp and percocet. New IV access obtained, started IV antibiotics, and IVF. Consent signed for surgery. No new problems or concerns and will continue with POC.

## 2023-10-20 NOTE — ANESTHESIA PREPROCEDURE EVALUATION
Anesthesia Evaluation     Patient summary reviewed and Nursing notes reviewed   no history of anesthetic complications:   NPO Solid Status: > 8 hours  NPO Liquid Status: > 8 hours           Airway   Mallampati: II  TM distance: >3 FB  Neck ROM: limited  Difficult intubation highly probable  Dental - normal exam         Pulmonary - normal exam    breath sounds clear to auscultation  (+) asthma,  Cardiovascular - negative cardio ROS and normal exam    Rhythm: regular  Rate: normal        Neuro/Psych  (+) psychiatric history Anxiety and Depression  GI/Hepatic/Renal/Endo    (+) obesity    Musculoskeletal (-) negative ROS    Abdominal  - normal exam   Substance History - negative use     OB/GYN negative ob/gyn ROS   (-)  Pregnant        Other - negative ROS       ROS/Med Hx Other: C-spine 8/19/2021:  Ankylosis of C5-C6 with small vertebral bodies, may be due to juvenile idiopathic arthritis or Klippel-Feil syndrome.                Anesthesia Plan    ASA 3     general and general with block     (TAP blocks for post op pain control. )  intravenous induction     Anesthetic plan, risks, benefits, and alternatives have been provided, discussed and informed consent has been obtained with: patient.  Pre-procedure education provided  Plan discussed with CRNA.    CODE STATUS:    Level Of Support Discussed With: Patient  Code Status (Patient has no pulse and is not breathing): CPR (Attempt to Resuscitate)  Medical Interventions (Patient has pulse or is breathing): Full Support

## 2023-10-20 NOTE — H&P
Chief complaint cholelithiasis, cholecystitis    Subjective     Patient is a 28 y.o. female who presented to the emergency room with severe right upper quadrant pain, nausea and vomiting.  Work-up demonstrated findings compatible with cholecystitis with a positive Henriquez sign.  Patient denies any prior attacks before this current 1      Review of Systems  Review of Systems - General ROS: negative for - weight loss  Psychological ROS: negative for - behavioral disorder  Ophthalmic ROS: negative for - dry eyes  ENT ROS: negative for - vertigo or vocal changes  Hematological and Lymphatic ROS: negative for - jaundice or swollen lymph nodes  Respiratory ROS: negative for - sputum changes or stridor  Cardiovascular ROS: negative for - irregular heartbeat or murmur  Gastrointestinal ROS: negative for - blood in stools or change in stools  Genitourinary ROS: negative for - hematuria or incontinence  Musculoskeletal ROS: negative for - gait disturbance      History  Past Medical History:   Diagnosis Date    Anxiety     Asthma     Depression     Klippel-Feil syndrome      Past Surgical History:   Procedure Laterality Date     SECTION N/A 2023    Procedure:  SECTION PRIMARY;  Surgeon: Kassi Martinez MD;  Location: [facility] LABOR DELIVERY;  Service: Obstetrics;  Laterality: N/A;   Appendectomy  History reviewed. No pertinent family history.  Social History     Tobacco Use    Smoking status: Never     Passive exposure: Never    Smokeless tobacco: Never   Vaping Use    Vaping Use: Never used   Substance Use Topics    Alcohol use: Never    Drug use: Never     Medications Prior to Admission   Medication Sig Dispense Refill Last Dose    citalopram (CeleXA) 10 MG tablet Take 1 tablet by mouth Daily.       docusate sodium 100 MG capsule Take 1 capsule by mouth 2 (Two) Times a Day As Needed for Constipation. 40 capsule 1     folic acid (FOLVITE) 1 MG tablet Take 1 tablet by mouth Daily.       prenatal  "vitamin (prenatal, CLASSIC, vitamin) tablet Take 1 tablet by mouth Daily.       simethicone (MYLICON) 80 MG chewable tablet Chew and swallow 1 tablet 4 (Four) Times a Day. 40 tablet 1      Allergies:  Patient has no known allergies.    Objective     Vital Signs  Temp:  [98.1 °F (36.7 °C)] 98.1 °F (36.7 °C)  Heart Rate:  [62] 62  Resp:  [17] 17  BP: (140)/(85) 140/85    Physical Exam  General:  This is a WD WN female in no acute distress  Vital signs: Stable, afebrile  HEENT exam:  WNL. Sclerae are anicteric.  EOMI. poor dentition  Neck:  Supple, FROM.  No JVD.  Trachea midline  Lungs:  Respiratory effort normal. Auscultation: Clear, without wheezes, rhonchi, rales  Heart:  Regular rate and rhythm, without murmur, gallop, rub.  No pedal edema  Abdomen: Bowel sounds are present.  Patient reports tenderness to palpation in the right upper quadrant but there is no Henriquez sign.  Musculoskeletal:  Muscle strength/tone is normal.    Psychiatric:  Alert, oriented x 3.  Mood and affect are appropriate  Skin:  Warm with good turgor.  Without rash or lesion  Extremities:  Examination of the extremities revealed no cyanosis, clubbing or edema.    Results Review:       Results from last 7 days   Lab Units 10/19/23  1844   WBC 10*3/mm3 9.85   HEMOGLOBIN g/dL 15.7   HEMATOCRIT % 46.7*   PLATELETS 10*3/mm3 288         Results from last 7 days   Lab Units 10/19/23  1844   SODIUM mmol/L 141   POTASSIUM mmol/L 4.5   CHLORIDE mmol/L 106   CO2 mmol/L 23.9   BUN mg/dL 13   CREATININE mg/dL 0.79   CALCIUM mg/dL 9.6   GLUCOSE mg/dL 95   ALBUMIN g/dL 5.2   BILIRUBIN mg/dL 0.2   ALK PHOS U/L 68   AST (SGOT) U/L 25   ALT (SGPT) U/L 33   Estimated Creatinine Clearance: 138.4 mL/min (by C-G formula based on SCr of 0.79 mg/dL).  No results found for: \"AMMONIA\"      No results found for: \"BLOODCX\"  No results found for: \"URINECX\"  No results found for: \"WOUNDCX\"  No results found for: \"STOOLCX\"    Imaging:  Imaging Results (Last 24 Hours)       " Procedure Component Value Units Date/Time    US Gallbladder [[id number]] Collected: 10/19/23 1933     Updated: 10/19/23 1937    Narrative:            Procedure: Ultrasound gallbladder with grayscale and color Doppler  imaging.     Findings: Gallbladder is distended.  A gallbladder stone is noted within  the gallbladder neck.  A sonographic Henriquez sign was elicited during  this examination.  No pericholecystic fluid or gallbladder wall  thickening is identified.     Gallbladder wall measures 2.8 mm.  Common bile duct measures 2.7 mm,  within normal limits.  Hepatopetal flow noted.       Impression:         Gallbladder distention with cholelithiasis noted within the gallbladder  neck and a sonographic Henriquez sign, suggestive of acute cholecystitis.   Surgical consultation should be considered if appropriate.        This report was finalized on 10/19/2023 7:35 PM by Clau Farah MD.               Ultrasound reports and images were reviewed.  I agree with the assessment      Impression:  Patient Active Problem List   Diagnosis Code    Pregnancy Z34.90    Postpartum care following  delivery Z39.2    Cholelithiasis K80.20       Plan:  Symptomatic cholelithiasis      Discussion:  Proceed with laparoscopic cholecystectomy    Tiana Queen MD  10/19/23  20:22 EDT      Please note that portions of this note were completed with a voice recognition program.

## 2023-10-24 LAB — REF LAB TEST METHOD: NORMAL

## 2023-11-07 NOTE — ED PROVIDER NOTES
Subjective   History of Present Illness  Pt states that she began having bilateral lower abdominal pain last night with vomiting beginning today.having severe right upper quadrant pain, nausea and vomiting. Pt  compatible with cholecystitis with a positive Henriquez sign    History provided by:  Patient   used: No    Vomiting  The primary symptoms include vomiting. The illness began today. The onset was sudden. The problem has been gradually worsening.   Significant associated medical issues include gallstones.       Review of Systems   Gastrointestinal:  Positive for vomiting.       Past Medical History:   Diagnosis Date    Abdominal pain     Anxiety     Asthma     Depression     GERD (gastroesophageal reflux disease)     Klippel-Feil syndrome     Nausea and vomiting        No Known Allergies    Past Surgical History:   Procedure Laterality Date    ABDOMINAL SURGERY      APPENDECTOMY       SECTION N/A 2023    Procedure:  SECTION PRIMARY;  Surgeon: Kassi Martinez MD;  Location: [facility] LABOR DELIVERY;  Service: Obstetrics;  Laterality: N/A;    CHOLECYSTECTOMY N/A 10/20/2023    Procedure: CHOLECYSTECTOMY LAPAROSCOPIC;  Surgeon: Tiana Queen MD;  Location: [facility] OR;  Service: General;  Laterality: N/A;    MYRINGOTOMY W/ TUBES Bilateral     TONSILLECTOMY         History reviewed. No pertinent family history.    Social History     Socioeconomic History    Marital status: Unknown   Tobacco Use    Smoking status: Former     Packs/day: 1.00     Years: 13.00     Additional pack years: 0.00     Total pack years: 13.00     Types: Cigarettes     Passive exposure: Never    Smokeless tobacco: Never   Vaping Use    Vaping Use: Every day    Substances: Nicotine, Flavoring    Devices: Refillable tank   Substance and Sexual Activity    Alcohol use: Never    Drug use: Never    Sexual activity: Yes     Partners: Male     Birth control/protection: None           Objective   Physical  Exam  Vitals and nursing note reviewed.   Constitutional:       Appearance: She is well-developed.   HENT:      Head: Normocephalic.   Cardiovascular:      Rate and Rhythm: Normal rate and regular rhythm.   Pulmonary:      Effort: Pulmonary effort is normal.      Breath sounds: Normal breath sounds.   Abdominal:      General: Bowel sounds are normal.      Palpations: Abdomen is soft.      Tenderness: There is abdominal tenderness in the right upper quadrant.   Musculoskeletal:         General: Normal range of motion.      Cervical back: Neck supple.   Skin:     General: Skin is warm and dry.   Neurological:      Mental Status: She is alert and oriented to person, place, and time.   Psychiatric:         Behavior: Behavior normal.         Thought Content: Thought content normal.         Judgment: Judgment normal.         Procedures           ED Course      FL rudi endo (surgery)   Final Result      US Gallbladder   Final Result       Gallbladder distention with cholelithiasis noted within the gallbladder   neck and a sonographic Henriquez sign, suggestive of acute cholecystitis.    Surgical consultation should be considered if appropriate.           This report was finalized on 10/19/2023 7:35 PM by Clau Farah MD.                                                Medical Decision Making  Amount and/or Complexity of Data Reviewed  Labs: ordered.  Radiology: ordered.    Risk  Prescription drug management.  Decision regarding hospitalization.        Final diagnoses:   Calculus of gallbladder with acute cholecystitis without obstruction       ED Disposition  ED Disposition       ED Disposition   Decision to Admit    Condition   --    Comment   Level of Care: Med/Surg [1]   Diagnosis: Cholelithiasis [001428]   Admitting Physician: PER CABRERA [[id number]]   Isolate for COVID?: No [0]   Certification: I Certify That Inpatient Hospital Services Are Medically Necessary For Greater Than 2 Midnights                 Alexandre  Allison CLIFTON, APRN  [address] [facility] [address]  [phone]               Medication List      No changes were made to your prescriptions during this visit.            Stormy Martinez PA  11/09/23 2000       Stormy Martinez PA  11/09/23 2001

## 2023-11-08 NOTE — DISCHARGE SUMMARY
[facility] GENERAL SURGERY DISCHARGE SUMMARY < 48 HOURS    Patient Identification:  Name:  Ashley Araujo  Age:  28 y.o.  Sex:  female  :  1995  MRN:  [id number]    Date of Admission: 10/19/2023  Date of Discharge:  10/20/2023     ADMISSION DIAGNOSIS:    DISCHARGE DIAGNOSIS:  Same    PROCEDURES PERFORMED:  Procedure(s):  CHOLECYSTECTOMY LAPAROSCOPIC       HOSPITAL COURSE  Patient is a 28 y.o. female admitted to [facility] with cholelithiasis..  Please see the admitting history and physical for further details.  She was admitted through the emergency room and underwent laparoscopic cholecystectomy on 10/20/2023.  Postoperatively the patient did well and tolerated the diet and was discharged home the afternoon following surgery.    CONDITION AT DISCHARGE:  Improved    DISCHARGE DISPOSITION   Home    DISCHARGE MEDICATIONS:     Discharge Medications        Continue These Medications        Instructions Start Date   citalopram 10 MG tablet  Commonly known as: CeleXA   10 mg, Oral, Daily               FOLLOW-UP:  Dr. Queen in 2 weeks    Activity and diet instructions given to the patient

## (undated) DEVICE — ELECTRD BLD EZ CLN MOD 4IN

## (undated) DEVICE — TUBING, SUCTION, 1/4" X 20', STRAIGHT: Brand: MEDLINE INDUSTRIES, INC.

## (undated) DEVICE — PATIENT RETURN ELECTRODE, SINGLE-USE, CONTACT QUALITY MONITORING, ADULT, WITH 9FT CORD, FOR PATIENTS WEIGING OVER 33LBS. (15KG): Brand: MEGADYNE

## (undated) DEVICE — SOLIDIFIER LIQ PREMISORB 2000CC

## (undated) DEVICE — TUBING, SUCTION, 3/16" X 10', STRAIGHT: Brand: MEDLINE

## (undated) DEVICE — ELECTRD NDL MEGADYNE EZCLEAN NOSE 7CM

## (undated) DEVICE — ENDOPOUCH RETRIEVER SPECIMEN RETRIEVAL BAGS: Brand: ENDOPOUCH RETRIEVER

## (undated) DEVICE — SUCTION CANISTER, 1500CC, RIGID: Brand: DEROYAL

## (undated) DEVICE — TROCAR: Brand: KII SLEEVE

## (undated) DEVICE — ENDOPATH XCEL UNIVERSAL TROCAR STABLILITY SLEEVES: Brand: ENDOPATH XCEL

## (undated) DEVICE — ENDOPATH XCEL BLADELESS TROCARS WITH STABILITY SLEEVES: Brand: ENDOPATH XCEL

## (undated) DEVICE — 3M™ STERI-STRIP™ BLEND TONE SKIN CLOSURES, B1557, TAN, 1/2 IN X 4 IN (12MM X 100MM), 6 STRIPS/ENVELOPE: Brand: 3M™ STERI-STRIP™

## (undated) DEVICE — 40595 XL TRENDELENBURG POSITIONING KIT: Brand: 40595 XL TRENDELENBURG POSITIONING KIT

## (undated) DEVICE — MONOPOLAR METZENBAUM SCISSOR TIP, DISPOSABLE: Brand: MONOPOLAR METZENBAUM SCISSOR TIP, DISPOSABLE

## (undated) DEVICE — ADHS LIQ MASTISOL 2/3ML

## (undated) DEVICE — PENCL ES MEGADINE EZ/CLEAN BUTN W/HOLSTR 10FT

## (undated) DEVICE — UNDYED BRAIDED (POLYGLACTIN 910), SYNTHETIC ABSORBABLE SUTURE: Brand: COATED VICRYL

## (undated) DEVICE — GLV SURG PREMIERPRO MIC LTX PF SZ7 BRN

## (undated) DEVICE — ELECTRODE,FOAM,MONITORING,SLD GEL,5 PK: Brand: MEDLINE

## (undated) DEVICE — INSUFFLATION NEEDLE TO ESTABLISH PNEUMOPERITONEUM.: Brand: INSUFFLATION NEEDLE

## (undated) DEVICE — 2, DISPOSABLE SUCTION/IRRIGATOR WITH DISPOSABLE TIP: Brand: STRYKEFLOW

## (undated) DEVICE — BASIN SET PACK: Brand: MEDLINE INDUSTRIES, INC.

## (undated) DEVICE — TROCAR: Brand: KII FIOS FIRST ENTRY

## (undated) DEVICE — PK LAP GEN 70

## (undated) DEVICE — ELECTRD BLD EDGE/INSUL1P SFTY SLV 2.75IN

## (undated) DEVICE — SUT MNCRYL PLS ANTIB UD 4/0 PS2 18IN